# Patient Record
Sex: FEMALE | Race: WHITE | NOT HISPANIC OR LATINO | Employment: OTHER | URBAN - METROPOLITAN AREA
[De-identification: names, ages, dates, MRNs, and addresses within clinical notes are randomized per-mention and may not be internally consistent; named-entity substitution may affect disease eponyms.]

---

## 2017-01-02 ENCOUNTER — TELEPHONE (OUTPATIENT)
Dept: FAMILY MEDICINE | Facility: CLINIC | Age: 62
End: 2017-01-02

## 2017-01-02 PROBLEM — M85.80 OSTEOPENIA: Status: ACTIVE | Noted: 2017-01-02

## 2017-01-09 ENCOUNTER — TELEPHONE (OUTPATIENT)
Dept: FAMILY MEDICINE | Facility: CLINIC | Age: 62
End: 2017-01-09

## 2017-01-09 DIAGNOSIS — E78.5 HYPERLIPIDEMIA LDL GOAL <160: Primary | ICD-10-CM

## 2017-01-09 NOTE — LETTER
[facility]  [address] [facility] [id number]  [phone]        May 15, 2017    Lizy Clark  [address] [place] [id number]              Dear Lizy Clark    This is to remind you that your fasting lab work is due.    You may call our office at [phone] to schedule an appointment.    Please disregard this notice if you have already had your labs drawn or made an appointment.        Sincerely,        Lisbet Grant PA-C

## 2017-01-09 NOTE — TELEPHONE ENCOUNTER
Your bone scan shows osteopenia, the precursor to osteoporosis. As of now I recommend the following supplementation:  Calcium 600mg twice daily AND vitamin D 2,000 units once daily.  We'll repeat your bone scan in 3 years.    If she's getting 600mg of calcium twice daily through diet then that would do it - if she's falling short then I recommend supplementation also. I'm not concerned that this would increase cardiovascular disease.    Her triglycerides are normal - no concerns. Trigs are just another type of cholesterol in the blood stream. Her LDL (bad) cholesterol is high. I recommend starting medication to decrease cholesterol, decrease risks of plaque build up in the heart and decrease risk of heart attack. Is she agreeable?

## 2017-01-09 NOTE — TELEPHONE ENCOUNTER
Spoke with pt. She has a few questions she would like answered from DEXA scan and cholesterol labs. Please review below and advise. Raven Kaplan MA      1. She read some things online that had her worried. Her t-score from DEXA- is it normal or should she be worries?  2. She read taking high doses of calcium can lead to cardiovascular disease. She wanted to know if this was true?  3. Instead of taking supplements she get enough calcium through food and drink?  4. Care cholesterol and triglycerides linked? Does one score effect the other? She read you will see a change in triglycerides before you see a change in cholesterol numbers

## 2017-01-09 NOTE — TELEPHONE ENCOUNTER
Spoke with patient and informed her of results and further explanation per Lisbet Grant PA-C see below read word for word.   Patient verbalized understanding and stated she has only been on her new diet for one month and would like to give her diet at least another 6 months to see if she can get cholesterol numbers improved.  Will let provider know.

## 2017-06-26 ENCOUNTER — TELEPHONE (OUTPATIENT)
Dept: FAMILY MEDICINE | Facility: CLINIC | Age: 62
End: 2017-06-26

## 2017-06-26 NOTE — TELEPHONE ENCOUNTER
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 Please call to discuss. Thank you.

## 2017-10-11 ENCOUNTER — TELEPHONE (OUTPATIENT)
Dept: FAMILY MEDICINE | Facility: CLINIC | Age: 62
End: 2017-10-11

## 2017-10-11 NOTE — TELEPHONE ENCOUNTER
Patient states she would like to know what dosage of the calcium pills she is supposed to take.  Please call.    Thank you.

## 2017-10-11 NOTE — TELEPHONE ENCOUNTER
Attempted to call patient x 2.  Line busy.      Notes Recorded by Lisbet Grant PA-C on 1/2/2017 at 1:27 PM  Please send the following letter to the patient:    Lizy,    Your bone scan shows osteopenia, the precursor to osteoporosis. As of now I recommend the following supplementation:  Calcium 600mg twice daily AND vitamin D 2,000 units once daily.  We'll repeat your bone scan in 3 years.    Please call me with any questions or concerns.

## 2017-10-13 NOTE — TELEPHONE ENCOUNTER
Spoke with patient. Went over supplement instructions per Lisbet Grant PA-C see below.  Patient verbalized understanding and had no further concerns.

## 2022-04-04 ENCOUNTER — OFFICE VISIT (OUTPATIENT)
Dept: FAMILY MEDICINE | Facility: CLINIC | Age: 67
End: 2022-04-04
Payer: COMMERCIAL

## 2022-04-04 VITALS
RESPIRATION RATE: 20 BRPM | HEIGHT: 63 IN | OXYGEN SATURATION: 95 % | TEMPERATURE: 98.5 F | DIASTOLIC BLOOD PRESSURE: 108 MMHG | WEIGHT: 148.6 LBS | BODY MASS INDEX: 26.33 KG/M2 | HEART RATE: 89 BPM | SYSTOLIC BLOOD PRESSURE: 161 MMHG

## 2022-04-04 DIAGNOSIS — E78.5 HYPERLIPIDEMIA LDL GOAL <70: Primary | ICD-10-CM

## 2022-04-04 DIAGNOSIS — Z86.73 HISTORY OF CEREBROVASCULAR ACCIDENT: ICD-10-CM

## 2022-04-04 PROCEDURE — 99203 OFFICE O/P NEW LOW 30 MIN: CPT | Performed by: PHYSICIAN ASSISTANT

## 2022-04-04 RX ORDER — ATORVASTATIN CALCIUM 40 MG/1
40 TABLET, FILM COATED ORAL DAILY
Qty: 60 TABLET | Refills: 0 | Status: SHIPPED | OUTPATIENT
Start: 2022-04-04 | End: 2022-04-08

## 2022-04-04 RX ORDER — ATORVASTATIN CALCIUM 40 MG/1
1 TABLET, FILM COATED ORAL DAILY
COMMUNITY
Start: 2022-02-23 | End: 2022-04-04

## 2022-04-04 RX ORDER — FOLIC ACID/MULTIVIT,IRON,MINER 0.4MG-18MG
1 TABLET ORAL DAILY
COMMUNITY

## 2022-04-04 ASSESSMENT — PAIN SCALES - GENERAL: PAINLEVEL: NO PAIN (0)

## 2022-04-04 NOTE — PROGRESS NOTES
"  Assessment & Plan     1. Hyperlipidemia LDL goal <70    2. History of cerebrovascular accident        1,2) Lipitor renewed, no changes. She will follow up for a physical in ~1 month and we'll do fasting labs that day.      Prescription drug management         BMI:   Estimated body mass index is 26.32 kg/m  as calculated from the following:    Height as of this encounter: 1.6 m (5' 3\").    Weight as of this encounter: 67.4 kg (148 lb 9.6 oz).       Return in about 1 month (around 5/4/2022) for your annual physical, fasting labs.    Lisbet Grant PA-C  [facility] JAY Medel is a 66 year old who presents for the following health issues     HPI     Establish Care  Last OV with Lisbet Grant PA-C was 12/21/2016    Hyperlipidemia Follow-Up      Are you regularly taking any medication or supplement to lower your cholesterol?   Yes- atorvastatin (LIPITOR) 40 MG tablet    Are you having muscle aches or other side effects that you think could be caused by your cholesterol lowering medication?  No      Review of Systems   Constitutional, cardiovascular systems are negative, except as otherwise noted.      Objective    BP (!) 161/108   Pulse 89   Temp 98.5  F (36.9  C) (Tympanic)   Resp 20   Ht 1.6 m (5' 3\")   Wt 67.4 kg (148 lb 9.6 oz)   LMP  (LMP Unknown)   SpO2 95%   Breastfeeding No   BMI 26.32 kg/m    Body mass index is 26.32 kg/m .  Physical Exam   GENERAL: healthy, alert and no distress  MS: no gross musculoskeletal defects noted, no edema  SKIN: no suspicious lesions or rashes  NEURO: Normal strength and tone, mentation intact and speech normal  PSYCH: mentation appears normal, affect normal/bright    ----- Services Performed by a MEDICAL STUDENT in Presence of ATTENDING Physician-------        "

## 2022-04-04 NOTE — PATIENT INSTRUCTIONS
Ask your insurance if they'll cover a 24-hour blood pressure at home monitor for elevated blood pressure readings (without hypertension).    Ask your insurance about the Shingles vaccine:  1) Is it covered?  2) Where should I get it?

## 2022-04-04 NOTE — NURSING NOTE
Advance Care Planning    Health care directive given to patient today. Patient will bring a copy to next appointment.    Idalia Simmons CMA on 4/4/2022 at 5:00 PM

## 2022-08-24 ENCOUNTER — OFFICE VISIT (OUTPATIENT)
Dept: FAMILY MEDICINE | Facility: CLINIC | Age: 67
End: 2022-08-24
Payer: COMMERCIAL

## 2022-08-24 ENCOUNTER — MYC MEDICAL ADVICE (OUTPATIENT)
Dept: FAMILY MEDICINE | Facility: CLINIC | Age: 67
End: 2022-08-24

## 2022-08-24 VITALS
TEMPERATURE: 97.6 F | HEIGHT: 63 IN | BODY MASS INDEX: 25.73 KG/M2 | SYSTOLIC BLOOD PRESSURE: 142 MMHG | WEIGHT: 145.2 LBS | OXYGEN SATURATION: 97 % | HEART RATE: 82 BPM | RESPIRATION RATE: 16 BRPM | DIASTOLIC BLOOD PRESSURE: 78 MMHG

## 2022-08-24 DIAGNOSIS — E78.5 HYPERLIPIDEMIA LDL GOAL <160: Primary | ICD-10-CM

## 2022-08-24 DIAGNOSIS — E78.5 HYPERLIPIDEMIA LDL GOAL <70: ICD-10-CM

## 2022-08-24 DIAGNOSIS — R03.0 ELEVATED BLOOD PRESSURE READING WITHOUT DIAGNOSIS OF HYPERTENSION: ICD-10-CM

## 2022-08-24 DIAGNOSIS — Z12.31 VISIT FOR SCREENING MAMMOGRAM: ICD-10-CM

## 2022-08-24 DIAGNOSIS — Z78.0 POSTMENOPAUSAL STATUS: ICD-10-CM

## 2022-08-24 DIAGNOSIS — Z01.01 FAILED VISION SCREEN: ICD-10-CM

## 2022-08-24 DIAGNOSIS — Z13.1 DIABETES MELLITUS SCREENING: ICD-10-CM

## 2022-08-24 PROBLEM — R73.01 ELEVATED FASTING GLUCOSE: Status: ACTIVE | Noted: 2022-08-24

## 2022-08-24 LAB
ALBUMIN SERPL-MCNC: 3.9 G/DL (ref 3.4–5)
ALP SERPL-CCNC: 78 U/L (ref 40–150)
ALT SERPL W P-5'-P-CCNC: 43 U/L (ref 0–50)
AST SERPL W P-5'-P-CCNC: 24 U/L (ref 0–45)
BILIRUB DIRECT SERPL-MCNC: 0.2 MG/DL (ref 0–0.2)
BILIRUB SERPL-MCNC: 1.1 MG/DL (ref 0.2–1.3)
CHOLEST SERPL-MCNC: 178 MG/DL
FASTING STATUS PATIENT QL REPORTED: YES
FASTING STATUS PATIENT QL REPORTED: YES
GLUCOSE BLD-MCNC: 106 MG/DL (ref 70–99)
HDLC SERPL-MCNC: 81 MG/DL
LDLC SERPL CALC-MCNC: 86 MG/DL
NONHDLC SERPL-MCNC: 97 MG/DL
PROT SERPL-MCNC: 7.2 G/DL (ref 6.8–8.8)
TRIGL SERPL-MCNC: 54 MG/DL

## 2022-08-24 PROCEDURE — 36415 COLL VENOUS BLD VENIPUNCTURE: CPT | Performed by: PHYSICIAN ASSISTANT

## 2022-08-24 PROCEDURE — 99213 OFFICE O/P EST LOW 20 MIN: CPT | Mod: 25 | Performed by: PHYSICIAN ASSISTANT

## 2022-08-24 PROCEDURE — 90715 TDAP VACCINE 7 YRS/> IM: CPT | Performed by: PHYSICIAN ASSISTANT

## 2022-08-24 PROCEDURE — 90471 IMMUNIZATION ADMIN: CPT | Performed by: PHYSICIAN ASSISTANT

## 2022-08-24 PROCEDURE — 80076 HEPATIC FUNCTION PANEL: CPT | Performed by: PHYSICIAN ASSISTANT

## 2022-08-24 PROCEDURE — 82947 ASSAY GLUCOSE BLOOD QUANT: CPT | Performed by: PHYSICIAN ASSISTANT

## 2022-08-24 PROCEDURE — 80061 LIPID PANEL: CPT | Performed by: PHYSICIAN ASSISTANT

## 2022-08-24 PROCEDURE — G0402 INITIAL PREVENTIVE EXAM: HCPCS | Performed by: PHYSICIAN ASSISTANT

## 2022-08-24 RX ORDER — ATORVASTATIN CALCIUM 40 MG/1
40 TABLET, FILM COATED ORAL DAILY
Qty: 90 TABLET | Refills: 3 | Status: SHIPPED | OUTPATIENT
Start: 2022-08-24 | End: 2023-10-16

## 2022-08-24 ASSESSMENT — ENCOUNTER SYMPTOMS
DYSURIA: 0
WEAKNESS: 0
FEVER: 0
CONSTIPATION: 0
HEMATURIA: 0
NAUSEA: 0
HEADACHES: 0
ARTHRALGIAS: 0
ABDOMINAL PAIN: 0
SORE THROAT: 0
DIZZINESS: 0
COUGH: 0
DIARRHEA: 0
NERVOUS/ANXIOUS: 0
HEARTBURN: 0
CHILLS: 0
JOINT SWELLING: 0
PALPITATIONS: 0
SHORTNESS OF BREATH: 0
BREAST MASS: 0
MYALGIAS: 0
HEMATOCHEZIA: 0
FREQUENCY: 0
PARESTHESIAS: 0

## 2022-08-24 ASSESSMENT — PAIN SCALES - GENERAL: PAINLEVEL: NO PAIN (0)

## 2022-08-24 ASSESSMENT — ACTIVITIES OF DAILY LIVING (ADL): CURRENT_FUNCTION: NO ASSISTANCE NEEDED

## 2022-08-24 NOTE — PROGRESS NOTES
"SUBJECTIVE:   Lizy Clark is a 67 year old female who presents for Preventive Visit.      Patient has been advised of split billing requirements and indicates understanding: Yes  Are you in the first 12 months of your Medicare coverage?  Yes,  Visual Acuity:  Right Eye: 20/50   Left Eye: 20/80  Both Eyes: 20/50    Healthy Habits:     In general, how would you rate your overall health?  Excellent    Frequency of exercise:  4-5 days/week    Duration of exercise:  45-60 minutes    Do you usually eat at least 4 servings of fruit and vegetables a day, include whole grains    & fiber and avoid regularly eating high fat or \"junk\" foods?  Yes    Taking medications regularly:  Yes    Medication side effects:  None    Ability to successfully perform activities of daily living:  No assistance needed    Home Safety:  No safety concerns identified    Hearing Impairment:  No hearing concerns    In the past 6 months, have you been bothered by leaking of urine?  No    In general, how would you rate your overall mental or emotional health?  Excellent      PHQ-2 Total Score: 0    Additional concerns today:  No    Do you feel safe in your environment? Yes    Have you ever done Advance Care Planning? (For example, a Health Directive, POLST, or a discussion with a medical provider or your loved ones about your wishes): No, advance care planning information given to patient to review.  Advanced care planning was discussed at today's visit.       Fall risk  Fallen 2 or more times in the past year?: No  Any fall with injury in the past year?: No    Cognitive Screening   1) Repeat 3 items (Leader, Season, Table)    2) Clock draw: NORMAL  3) 3 item recall: Recalls 3 objects  Results: 3 items recalled: COGNITIVE IMPAIRMENT LESS LIKELY    Mini-CogTM Copyright YOSHI Tee. Licensed by the author for use in [facility]; reprinted with permission (mary [email or web address]). All rights reserved.      Reviewed and updated as needed this visit by " clinical staff   Tobacco  Allergies  Meds   Med Hx  Surg Hx  Fam Hx  Soc Hx          Reviewed and updated as needed this visit by Provider                   Social History     Tobacco Use     Smoking status: Never Smoker     Smokeless tobacco: Never Used   Substance Use Topics     Alcohol use: No     Alcohol/week: 0.0 standard drinks         Alcohol Use 8/24/2022   Prescreen: >3 drinks/day or >7 drinks/week? No   Prescreen: >3 drinks/day or >7 drinks/week? -       PROBLEMS TO ADD ON...  -------------------------------------  Discuss vitamins she is taking     Skin check - mole on right cheek    Needing refills and/or labs for:  Hyperlipidemia  Following a low fat diet? Yes  Medication side effects? No    Additional medications:  None      Current providers sharing in care for this patient include:   Patient Care Team:  Lisbet Grant PA-C as PCP - General (Physician Assistant)  Lisbet Grant PA-C as Physician Assistant (Physician Assistant)  Lisbet Grant PA-C as Assigned PCP    The following health maintenance items are reviewed in Epic and correct as of today:  Health Maintenance Due   Topic Date Due     ZOSTER IMMUNIZATION (1 of 2) Never done     LIPID  12/21/2017     MAMMO SCREENING  12/21/2018     Pneumococcal Vaccine: 65+ Years (1 - PCV) Never done     COVID-19 Vaccine (3 - Booster for Moderna series) 11/15/2021     DEXA  12/30/2021     Lab work is in process  Labs reviewed in EPIC    FHS-7:   Breast CA Risk Assessment (FHS-7) 8/24/2022   Did any of your first-degree relatives have breast or ovarian cancer? Yes   Did any of your relatives have bilateral breast cancer? No       Mammogram Screening: Recommended mammography every 1-2 years with patient discussion and risk factor consideration  Pertinent mammograms are reviewed under the imaging tab.    Review of Systems   Constitutional: Negative for chills and fever.   HENT: Negative for congestion, ear pain, hearing loss and sore  "throat.    Eyes: Negative for visual disturbance.   Respiratory: Negative for cough and shortness of breath.    Cardiovascular: Negative for chest pain, palpitations and peripheral edema.   Gastrointestinal: Negative for abdominal pain, constipation, diarrhea, heartburn, hematochezia and nausea.   Breasts:  Negative for tenderness, breast mass and discharge.   Genitourinary: Negative for dysuria, frequency, genital sores, hematuria, pelvic pain, urgency, vaginal bleeding and vaginal discharge.   Musculoskeletal: Negative for arthralgias, joint swelling and myalgias.   Skin: Negative for rash.   Neurological: Negative for dizziness, weakness, headaches and paresthesias.   Psychiatric/Behavioral: Negative for mood changes. The patient is not nervous/anxious.        OBJECTIVE:   BP (!) 142/78 (BP Location: Left arm, Patient Position: Sitting, Cuff Size: Adult Regular)   Pulse 82   Temp 97.6  F (36.4  C) (Tympanic)   Resp 16   Ht 1.6 m (5' 3\")   Wt 65.9 kg (145 lb 3.2 oz)   LMP  (LMP Unknown)   SpO2 97%   Breastfeeding No   BMI 25.72 kg/m   Estimated body mass index is 25.72 kg/m  as calculated from the following:    Height as of this encounter: 1.6 m (5' 3\").    Weight as of this encounter: 65.9 kg (145 lb 3.2 oz).  Physical Exam  GENERAL: healthy, alert and no distress  EYES: Eyes grossly normal to inspection, PERRL and conjunctivae and sclerae normal  HENT: ear canals and TM's normal, nose and mouth without ulcers or lesions  NECK: no adenopathy, no asymmetry, masses, or scars and thyroid normal to palpation  RESP: lungs clear to auscultation - no rales, rhonchi or wheezes  BREAST: normal without masses, tenderness or nipple discharge and no palpable axillary masses or adenopathy  CV: regular rates and rhythm, normal S1 S2, no S3 or S4 and no murmur, click or rub  ABDOMEN: soft, nontender, no hepatosplenomegaly, no masses and bowel sounds normal  MS: no gross musculoskeletal defects noted, no edema  SKIN: no " "suspicious lesions or rashes  NEURO: Normal strength and tone, mentation intact and speech normal  PSYCH: mentation appears normal, affect normal/bright      ASSESSMENT / PLAN:       ICD-10-CM    1. Hyperlipidemia LDL goal <160  E78.5 Lipid panel reflex to direct LDL Fasting     Hepatic function panel     Lipid panel reflex to direct LDL Fasting     Hepatic function panel   2. Diabetes mellitus screening  Z13.1 Glucose     Glucose   3. Visit for screening mammogram  Z12.31 MA SCREENING DIGITAL BILAT - Future  (s+30)   4. Postmenopausal status  Z78.0 DX Hip/Pelvis/Spine   5. Elevated blood pressure reading without diagnosis of hypertension  R03.0 24 Hour Blood Pressure Monitor - Adult   6. Hyperlipidemia LDL goal <70  E78.5 atorvastatin (LIPITOR) 40 MG tablet   7. Failed vision screen  Z01.01 Adult Eye  Referral       1-4) Screenings discussed    5) Blood pressure elevated. We discussed doing a 24-hour home blood pressure monitor for further assessment. She'll call her insurance regarding coverage.     6) Due for repeat lipid panel and LFTs. Med renewed, no change    7) Referral to eye      COUNSELING:  Reviewed preventive health counseling, as reflected in patient instructions    Estimated body mass index is 25.72 kg/m  as calculated from the following:    Height as of this encounter: 1.6 m (5' 3\").    Weight as of this encounter: 65.9 kg (145 lb 3.2 oz).    She reports that she has never smoked. She has never used smokeless tobacco.      Appropriate preventive services were discussed with this patient, including applicable screening as appropriate for cardiovascular disease, diabetes, osteopenia/osteoporosis, and glaucoma.  As appropriate for age/gender, discussed screening for colorectal cancer, prostate cancer, breast cancer, and cervical cancer. Checklist reviewing preventive services available has been given to the patient.    Reviewed patients plan of care and provided an AVS. The Basic Care Plan " (routine screening as documented in Health Maintenance) for Lizy meets the Care Plan requirement. This Care Plan has been established and reviewed with the Patient.    Counseling Resources:  ATP IV Guidelines  Pooled Cohorts Equation Calculator  Breast Cancer Risk Calculator  Breast Cancer: Medication to Reduce Risk  FRAX Risk Assessment  ICSI Preventive Guidelines  Dietary Guidelines for Americans, 2010  USDA's MyPlate  ASA Prophylaxis  Lung CA Screening    SO[facility] JAY    Identified Health Risks:

## 2022-08-24 NOTE — PATIENT INSTRUCTIONS
Call your insurance and ask if a 24-hour home blood pressure monitor is covered for elevated blood pressure readings without a diagnosis of hypertension.     Start a baby (81mg) aspirin once daily with a meal.    Ask your insurance about the Shingles vaccine:  1) Is it covered?  2) Where should I get it?

## 2022-08-24 NOTE — PROGRESS NOTES
Prior to immunization administration, verified patients identity using patient s name and date of birth. Please see Immunization Activity for additional information.     Screening Questionnaire for Adult Immunization    Are you sick today?   No   Do you have allergies to medications, food, a vaccine component or latex?   No   Have you ever had a serious reaction after receiving a vaccination?   No   Do you have a long-term health problem with heart, lung, kidney, or metabolic disease (e.g., diabetes), asthma, a blood disorder, no spleen, complement component deficiency, a cochlear implant, or a spinal fluid leak?  Are you on long-term aspirin therapy?   No   Do you have cancer, leukemia, HIV/AIDS, or any other immune system problem?   No   Do you have a parent, brother, or sister with an immune system problem?   No   In the past 3 months, have you taken medications that affect  your immune system, such as prednisone, other steroids, or anticancer drugs; drugs for the treatment of rheumatoid arthritis, Crohn s disease, or psoriasis; or have you had radiation treatments?   No   Have you had a seizure, or a brain or other nervous system problem?   No   During the past year, have you received a transfusion of blood or blood    products, or been given immune (gamma) globulin or antiviral drug?   No   For women: Are you pregnant or is there a chance you could become       pregnant during the next month?   No   Have you received any vaccinations in the past 4 weeks?   No     Immunization questionnaire answers were all negative.        Per orders of Lisbet Grant PA-C, injection of Tdap given by Idalia Antoine CMA. Patient instructed to remain in clinic for 15 minutes afterwards, and to report any adverse reaction to me immediately.       Screening performed by Idalia Antoine CMA on 8/24/2022 at 7:53 AM.

## 2022-08-26 ENCOUNTER — NURSE TRIAGE (OUTPATIENT)
Dept: NURSING | Facility: CLINIC | Age: 67
End: 2022-08-26

## 2022-08-26 NOTE — TELEPHONE ENCOUNTER
Patient calling requesting information on COVID 19 exposure.    Patient denies known exposure or current symptoms.    Stating she was asking on behalf of 2 year old child who had exposure only and attends her .    Reviewed COVID 19 exposure guidelines.    Reviewed with caller further information available on CDC web site.     Advised option to have mother of patient call back to complete triage.    Thea Galeas RN  [facility]          Reason for Disposition    [1] CLOSE CONTACT COVID-19 EXPOSURE within last 14 days AND [2] NO symptoms    Additional Information    Negative: COVID-19 lab test positive    Negative: [1] Lives with someone known to have influenza (flu test positive) AND [2] flu-like symptoms (e.g., cough, runny nose, sore throat, SOB; with or without fever)    Negative: [1] Symptoms of COVID-19 (e.g., cough, fever, SOB, or others) AND [2] doctor (or NP/PA) diagnosed COVID-19 based on symptoms    Negative: [1] Symptoms of COVID-19 (e.g., cough, fever, SOB, or others) AND [2] lives in an area with community spread    Negative: [1] Symptoms of COVID-19 (e.g., cough, fever, SOB, or others) AND [2] within 14 days of EXPOSURE (close contact) with diagnosed or suspected COVID-19 patient    Negative: [1] Symptoms of COVID-19 (e.g., cough, fever, SOB, or others) AND [2] within 14 days of travel from high-risk area for COVID-19 community spread (identified by CDC)    Negative: [1] Difficulty breathing (shortness of breath) occurs AND [2] onset > 14 days after COVID-19 EXPOSURE (Close Contact)    Negative: [1] Dry cough occurs AND [2] onset > 14 days after COVID-19 EXPOSURE    Negative: [1] Wet cough (i.e., white-yellow, yellow, green, or lalo colored sputum) AND [2] onset > 14 days after COVID-19 EXPOSURE    Negative: [1] Common cold symptoms AND [2] onset > 14 days after COVID-19 EXPOSURE    Negative: COVID-19 vaccine reaction suspected (e.g., fever, headache, muscle aches) occurring  during days 1 to 3 after getting vaccine    Negative: COVID-19 vaccine, questions about    Negative: [1] CLOSE CONTACT COVID-19 EXPOSURE within last 14 days AND [2] needs COVID-19 lab test to return to work AND [3] NO symptoms    Negative: [1] CLOSE CONTACT COVID-19 EXPOSURE within last 14 days AND [2] exposed person is a  (e.g., police or paramedic) AND [3] NO symptoms    Negative: [1] CLOSE CONTACT COVID-19 EXPOSURE within last 14 days AND [2] exposed person is a healthcare worker who was NOT using all recommended personal protective equipment (e.g., a respirator-N95 mask, eye protection, gloves, and gown) AND [3] NO symptoms    Negative: [1] Living or working in a correctional facility, long-term care facility, or shelter (i.e., congregate setting; densely populated) AND [2] where an outbreak has occurred AND [3] NO symptoms    Negative: [1] CLOSE CONTACT COVID-19 EXPOSURE within last 14 days AND [2] weak immune system (e.g., HIV positive, cancer chemo, splenectomy, organ transplant, chronic steroids) AND [3] NO symptoms    Protocols used: CORONAVIRUS (COVID-19) EXPOSURE-A-AH

## 2022-08-28 DIAGNOSIS — E78.5 HYPERLIPIDEMIA LDL GOAL <70: ICD-10-CM

## 2022-08-28 RX ORDER — ATORVASTATIN CALCIUM 40 MG/1
TABLET, FILM COATED ORAL
Qty: 90 TABLET | Refills: 3 | Status: CANCELLED | OUTPATIENT
Start: 2022-08-28

## 2022-08-29 RX ORDER — ATORVASTATIN CALCIUM 40 MG/1
TABLET, FILM COATED ORAL
Qty: 90 TABLET | Refills: 3 | OUTPATIENT
Start: 2022-08-29

## 2022-08-29 NOTE — TELEPHONE ENCOUNTER
Duplicate for Atorvastatin (Simvastatin) year supply was sent on 8/24/22.   Verónica Mcdaniel, RN, BSN

## 2022-10-22 ENCOUNTER — HEALTH MAINTENANCE LETTER (OUTPATIENT)
Age: 67
End: 2022-10-22

## 2022-11-09 ENCOUNTER — OFFICE VISIT (OUTPATIENT)
Dept: FAMILY MEDICINE | Facility: CLINIC | Age: 67
End: 2022-11-09
Payer: COMMERCIAL

## 2022-11-09 VITALS
RESPIRATION RATE: 16 BRPM | HEART RATE: 94 BPM | DIASTOLIC BLOOD PRESSURE: 82 MMHG | SYSTOLIC BLOOD PRESSURE: 142 MMHG | TEMPERATURE: 98.1 F | BODY MASS INDEX: 25.83 KG/M2 | OXYGEN SATURATION: 98 % | WEIGHT: 145.8 LBS

## 2022-11-09 DIAGNOSIS — B07.0 PLANTAR WART: Primary | ICD-10-CM

## 2022-11-09 DIAGNOSIS — R03.0 ELEVATED BLOOD PRESSURE READING WITHOUT DIAGNOSIS OF HYPERTENSION: ICD-10-CM

## 2022-11-09 PROCEDURE — 99213 OFFICE O/P EST LOW 20 MIN: CPT | Mod: 25 | Performed by: PHYSICIAN ASSISTANT

## 2022-11-09 PROCEDURE — 17110 DESTRUCTION B9 LES UP TO 14: CPT | Performed by: PHYSICIAN ASSISTANT

## 2022-11-09 ASSESSMENT — PAIN SCALES - GENERAL: PAINLEVEL: NO PAIN (0)

## 2022-11-09 NOTE — PATIENT INSTRUCTIONS
Marah Medel,    Thank you for allowing [facility] to manage your care.    Your blood pressure was high today. Get a blood pressure cuff for use at home. Take and record your blood pressure daily for 2 weeks. If your blood pressure is greater than or equal to 140/90mm Hg more than half of the time, please schedule an appointment with us for a recheck.    Once your wart has healed, please begin using Compound W product (or generic equivalent) as directed. If in one to two months the wart is still visible or you have symptoms, please see us again for another liquid nitrogen treatment.    If you have any questions or concerns, please feel free to call us at [phone]    Sincerely,    Reg Reagan PA-C    Did you know?      You can schedule a video visit for follow-up appointments as well as future appointments for certain conditions.  Please see the below link.     [email or web address]    If you have not already done so,  I encourage you to sign up for [email or web address] ([email or web address]).  This will allow you to review your results, securely communicate with a provider, and schedule virtual visits as well.

## 2022-11-09 NOTE — PROGRESS NOTES
"  Assessment & Plan   Problem List Items Addressed This Visit    None  Visit Diagnoses     Plantar wart    -  Primary    Relevant Orders    DESTRUCT BENIGN LESION, UP TO 14 (Completed)    Elevated blood pressure reading without diagnosis of hypertension             Wart treated as below with good tolerance and no complications.  I have no concerns for concurrent infectious processes such as cellulitis, abscess or necrotizing fasciitis.  She will use over-the-counter remedies after the procedure site has healed and follow-up with us in the next 1-2 months as needed. Tetanus UTD.    Complete history and physical exam as below. AF with normal VS except for elevated bp, which they will monitor at home and contact us if >140/90mmHg greater than 50% of the time.    DDx and Dx discussed with and explained to the pt to their satisfaction.  All questions were answered at this time. Pt expressed understanding of and agreement with this dx, tx, and plan. No further workup warranted and standard medication warnings given. I have given the patient a list of pertinent indications for re-evaluation. Will go to the Emergency Department if symptoms worsen or new concerning symptoms arise. Patient left in no apparent distress.     BMI:   Estimated body mass index is 25.83 kg/m  as calculated from the following:    Height as of 8/24/22: 1.6 m (5' 3\").    Weight as of this encounter: 66.1 kg (145 lb 12.8 oz).     See Patient Instructions    Return in about 1 month (around 12/9/2022) for a recheck of your symptoms if not improving.    GENTRY Ceron [facility] JAY Medel is a 67 year old presenting for the following health issues:  Wart      HPI     Warts  Onset/Duration: 1 year  Description (location/number): bottom of right foot, 1  Accompanying signs and symptoms (pain, redness): painful  History: prior warts:  no   Therapies tried and outcome: wart band aids    Pain is mild and non-radiating. " Fairly constant. No drainage.    Review of Systems   Constitutional, HEENT, cardiovascular, pulmonary, gi and gu systems are negative, except as otherwise noted.      Objective    BP (!) 142/82   Pulse 94   Temp 98.1  F (36.7  C) (Tympanic)   Resp 16   Wt 66.1 kg (145 lb 12.8 oz)   LMP  (LMP Unknown)   SpO2 98%   BMI 25.83 kg/m    Body mass index is 25.83 kg/m .  Physical Exam  Vitals and nursing note reviewed.   Constitutional:       General: She is not in acute distress.     Appearance: Normal appearance. She is not diaphoretic.   HENT:      Head: Normocephalic and atraumatic.      Nose: Nose normal.   Eyes:      Conjunctiva/sclera: Conjunctivae normal.   Pulmonary:      Effort: Pulmonary effort is normal. No respiratory distress.   Skin:     General: Skin is dry.      Coloration: Skin is not jaundiced or pale.      Comments: RLE: 3mm area of firmness with central core consistent with a plantar wart along the plantar surface overlying the 2nd MTP. Distal CMS intact. Remainder of limb non-tender. No other overlying signs of trauma or infection.   Neurological:      General: No focal deficit present.      Mental Status: She is alert. Mental status is at baseline.   Psychiatric:         Mood and Affect: Mood normal.         Behavior: Behavior normal.     Procedure: Wart cryotherapy    Verbal consent was obtained from parent/patient after a discussion of the risks and benefits. Area was prepped with alcohol and/or betadine. Liquid nitrogen was applied using a spray gun with good tolerance to the lesion after paring down with a #15 blade. No blood loss. 3 rounds of freeze/thaw. Pt tolerated the procedure well.  There were no complications.

## 2022-11-21 ENCOUNTER — NURSE TRIAGE (OUTPATIENT)
Dept: FAMILY MEDICINE | Facility: CLINIC | Age: 67
End: 2022-11-21

## 2022-11-21 NOTE — TELEPHONE ENCOUNTER
"    Reason for Disposition    [1] MILD dizziness (e.g., walking normally) AND [2] has NOT been evaluated by physician for this  (Exception: dizziness caused by heat exposure, sudden standing, or poor fluid intake)    Additional Information    Negative: SEVERE difficulty breathing (e.g., struggling for each breath, speaks in single words)    Negative: [1] Difficulty breathing or swallowing AND [2] started suddenly after medicine, an allergic food or bee sting    Negative: Shock suspected (e.g., cold/pale/clammy skin, too weak to stand, low BP, rapid pulse)    Negative: Difficult to awaken or acting confused (e.g., disoriented, slurred speech)    Negative: [1] Weakness (i.e., paralysis, loss of muscle strength) of the face, arm or leg on one side of the body AND [2] sudden onset AND [3] present now    Negative: [1] Numbness (i.e., loss of sensation) of the face, arm or leg on one side of the body AND [2] sudden onset AND [3] present now    Negative: [1] Loss of speech or garbled speech AND [2] sudden onset AND [3] present now    Negative: Overdose (accidental or intentional) of medications    Negative: [1] Fainted > 15 minutes ago AND [2] still feels too weak or dizzy to stand    Negative: Heart beating < 50 beats per minute OR > 140 beats per minute    Negative: Sounds like a life-threatening emergency to the triager    Negative: Chest pain    Negative: Rectal bleeding, bloody stool, or tarry-black stool    Negative: [1] Vomiting AND [2] contains red blood or black (\"coffee ground\") material    Negative: Vomiting is main symptom    Negative: Diarrhea is main symptom    Negative: Headache is main symptom    Negative: Patient states that they are having an anxiety or panic attack    Negative: Dizziness from low blood sugar (i.e., < 60 mg/dl or 3.5 mmol/l)    Negative: Dizziness is described as a spinning sensation (i.e., vertigo)    Negative: Heat exhaustion suspected (i.e., dehydration from heat exposure)    Negative: " "Difficulty breathing    Negative: SEVERE dizziness (e.g., unable to stand, requires support to walk, feels like passing out now)    Negative: Extra heart beats OR irregular heart beating (i.e., \"palpitations\")    Negative: [1] Drinking very little AND [2] dehydration suspected (e.g., no urine > 12 hours, very dry mouth, very lightheaded)    Negative: [1] Weakness (i.e., paralysis, loss of muscle strength) of the face, arm / hand, or leg / foot on one side of the body AND [2] sudden onset AND [3] brief (now gone)    Negative: [1] Numbness (i.e., loss of sensation) of the face, arm / hand, or leg / foot on one side of the body AND [2] sudden onset AND [3] brief (now gone)    Negative: [1] Loss of speech or garbled speech AND [2] sudden onset AND [3] brief (now gone)    Negative: Loss of vision or double vision (Exception: similar to previous migraines)    Negative: Patient sounds very sick or weak to the triager    Negative: [1] Dizziness caused by heat exposure, sudden standing, or poor fluid intake AND [2] no improvement after 2 hours of rest and fluids    Negative: [1] Fever > 103 F (39.4 C) AND [2] not able to get the fever down using Fever Care Advice    Negative: [1] Fever > 101 F (38.3 C) AND [2] age > 60 years    Negative: [1] Fever > 100.0 F (37.8 C) AND [2] bedridden (e.g., nursing home patient, CVA, chronic illness, recovering from surgery)    Negative: [1] Fever > 100.0 F (37.8 C) AND [2] diabetes mellitus or weak immune system (e.g., HIV positive, cancer chemo, splenectomy, organ transplant, chronic steroids)    Negative: [1] MODERATE dizziness (e.g., interferes with normal activities) AND [2] has NOT been evaluated by physician for this  (Exception: dizziness caused by heat exposure, sudden standing, or poor fluid intake)    Negative: Fever present > 3 days (72 hours)    Negative: Taking a medicine that could cause dizziness (e.g., blood pressure medications, diuretics)    Answer Assessment - Initial " "Assessment Questions  1. DESCRIPTION: \"Describe your dizziness.\"      Dizzy and nauseated for 2 weeks  2. LIGHTHEADED: \"Do you feel lightheaded?\" (e.g., somewhat faint, woozy, weak upon standing)     Sometimes feels room spinning and then she gets her bearings and fine to walk   3. VERTIGO: \"Do you feel like either you or the room is spinning or tilting?\" (i.e. vertigo)      As above  4. SEVERITY: \"How bad is it?\"  \"Do you feel like you are going to faint?\" \"Can you stand and walk?\"    Had to call in sick one day as \"I was feeling sick     5. ONSET:  \"When did the dizziness begin?\"      As above  6. AGGRAVATING FACTORS: \"Does anything make it worse?\" (e.g., standing, change in head position)    Worse in morning  7. HEART RATE: \"Can you tell me your heart rate?\" \"How many beats in 15 seconds?\"  (Note: not all patients can do this)       Feels heart rates is normal  8. CAUSE: \"What do you think is causing the dizziness?\"     Avorastatin, but she has been on this for 1 1/2 years  9. RECURRENT SYMPTOM: \"Have you had dizziness before?\" If Yes, ask: \"When was the last time?\" \"What happened that time?\"  not  10. OTHER SYMPTOMS: \"Do you have any other symptoms?\" (e.g., fever, chest pain, vomiting, diarrhea, bleeding)       No fever, no vomiting, normal bowels but has had diarrhea in the last couple days, loose stools twice a day    Protocols used: DIZZINESS - [id number]      "

## 2022-11-21 NOTE — TELEPHONE ENCOUNTER
Pt offered appt on 11/23 but she could not due to her work schedule so she choose to wait until 11/25 appt which was made for her with her PCP    She was also advised to take a covid test as this started with some cold sx     Sharla Petty, RN, BSN  [facility]            p

## 2022-12-19 ENCOUNTER — ANCILLARY PROCEDURE (OUTPATIENT)
Dept: MAMMOGRAPHY | Facility: CLINIC | Age: 67
End: 2022-12-19
Attending: PHYSICIAN ASSISTANT
Payer: COMMERCIAL

## 2022-12-19 DIAGNOSIS — Z12.31 VISIT FOR SCREENING MAMMOGRAM: ICD-10-CM

## 2022-12-19 PROCEDURE — 77067 SCR MAMMO BI INCL CAD: CPT | Performed by: RADIOLOGY

## 2022-12-19 PROCEDURE — 77063 BREAST TOMOSYNTHESIS BI: CPT | Performed by: RADIOLOGY

## 2022-12-22 ENCOUNTER — TELEPHONE (OUTPATIENT)
Dept: FAMILY MEDICINE | Facility: CLINIC | Age: 67
End: 2022-12-22

## 2022-12-22 NOTE — TELEPHONE ENCOUNTER
Patient calling for Mammogram results (done on 12/19/2022); informed patient results looked normal but provider has not commented at this time.      Will route to provider.    Patient disconnected unexpectedly; Called patient, no answer, left message on voicemail to call [facility] at [phone].    Trudy Adamson RN

## 2022-12-22 NOTE — TELEPHONE ENCOUNTER
Spoke with patient, relayed Lisbet Grant's message and patient verbalized understanding.    Trudy Adamson RN

## 2022-12-22 NOTE — TELEPHONE ENCOUNTER
IMPRESSION: ACR BI-RADS Category 1: Negative     RECOMMENDED FOLLOW-UP: Annual routine screening mammogram

## 2023-01-17 ENCOUNTER — TELEPHONE (OUTPATIENT)
Dept: FAMILY MEDICINE | Facility: CLINIC | Age: 68
End: 2023-01-17

## 2023-01-17 NOTE — TELEPHONE ENCOUNTER
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  Patient wondering if pink eye is contagious and if child should be sent home to parents.    Informed patient it sounds like pink eye based on symptoms and it is contagious; child should be sent home with parents and evaluated in clinic by their provider.  Informed patient she should also notify other children's parents of possible exposure and have contact precautions until parents  the child (clean off anything contaminated and wash hands).    Patient verbalized understanding and agreement.    Trudy Adamson RN

## 2023-04-10 ENCOUNTER — OFFICE VISIT (OUTPATIENT)
Dept: FAMILY MEDICINE | Facility: CLINIC | Age: 68
End: 2023-04-10
Payer: COMMERCIAL

## 2023-04-10 VITALS
OXYGEN SATURATION: 97 % | TEMPERATURE: 97.5 F | SYSTOLIC BLOOD PRESSURE: 154 MMHG | BODY MASS INDEX: 26.73 KG/M2 | HEART RATE: 97 BPM | HEIGHT: 64 IN | WEIGHT: 156.6 LBS | DIASTOLIC BLOOD PRESSURE: 90 MMHG | RESPIRATION RATE: 16 BRPM

## 2023-04-10 DIAGNOSIS — L84 CORN OR CALLUS: Primary | ICD-10-CM

## 2023-04-10 DIAGNOSIS — R26.9 ALTERED GAIT: ICD-10-CM

## 2023-04-10 DIAGNOSIS — Z86.73 HISTORY OF CEREBROVASCULAR ACCIDENT: ICD-10-CM

## 2023-04-10 DIAGNOSIS — Z12.11 SCREEN FOR COLON CANCER: ICD-10-CM

## 2023-04-10 PROCEDURE — 99213 OFFICE O/P EST LOW 20 MIN: CPT | Performed by: PHYSICIAN ASSISTANT

## 2023-04-10 NOTE — PROGRESS NOTES
"  Assessment & Plan       ICD-10-CM    1. Corn or callus  L84       2. Screen for colon cancer  Z12.11 Colonoscopy Screening  Referral      3. Altered gait  R26.9 Physical Therapy Referral      4. History of cerebrovascular accident  Z86.73 Physical Therapy Referral          1) Corn pared down with a bendable blade. Patient tolerated this well. No pinpoint hemorrhages seen. Skin lines appear to be uninterrupted. I recommended she wear corn pads over the area.     2) Order placed for colonoscopy.    3,4) Referral to physical therapy            BMI:   Estimated body mass index is 27.23 kg/m  as calculated from the following:    Height as of this encounter: 1.615 m (5' 3.58\").    Weight as of this encounter: 71 kg (156 lb 9.6 oz).     Return in about 5 months (around 9/10/2023) for your annual physical, a med check, fasting labs, with Lisbet, in person.     Lisbet Grant PA-C  [facility] JAY Medel is a 67 year old, presenting for the following health issues:  Derm Problem        4/10/2023     6:54 AM   Additional Questions   Roomed by mp   Accompanied by na         4/10/2023     6:54 AM   Patient Reported Additional Medications   Patient reports taking the following new medications none per patient     History of Present Illness       Reason for visit:  Wart removal    She eats 4 or more servings of fruits and vegetables daily.She consumes 0 sweetened beverage(s) daily.She exercises with enough effort to increase her heart rate 30 to 60 minutes per day.  She exercises with enough effort to increase her heart rate 4 days per week.   She is taking medications regularly.       WART(S)    Onset: ongoing    Description (location/number): 1, right foot    Accompanying signs and symptoms: Painful: no    Therapies tried and outcome: wart gun used prior     Hasn't changed since it was frozen last fall      Would like a physical therapy referral to discuss her gait  Her left knee " "doesn't always bend when she's walking  Seems to have started after her CVA  Denies joint pain, knee pain        Review of Systems   Constitutional, MSK, neuro, skin systems are negative, except as otherwise noted.      Objective    BP (!) 154/90   Pulse 97   Temp 97.5  F (36.4  C) (Temporal)   Resp 16   Ht 1.615 m (5' 3.58\")   Wt 71 kg (156 lb 9.6 oz)   LMP  (LMP Unknown)   SpO2 97%   BMI 27.23 kg/m    Body mass index is 27.23 kg/m .  Physical Exam   GENERAL: healthy, alert and no distress  MS: no gross musculoskeletal defects noted, no edema  SKIN: corn over 2nd MTP  NEURO: Normal strength and tone, mentation intact and speech normal  PSYCH: mentation appears normal, affect normal/bright              "

## 2023-05-03 ENCOUNTER — HOSPITAL ENCOUNTER (OUTPATIENT)
Dept: PHYSICAL THERAPY | Facility: CLINIC | Age: 68
Setting detail: THERAPIES SERIES
Discharge: HOME | End: 2023-05-03
Attending: PHYSICIAN ASSISTANT
Payer: COMMERCIAL

## 2023-05-03 DIAGNOSIS — Z86.73 HISTORY OF CEREBROVASCULAR ACCIDENT: ICD-10-CM

## 2023-05-03 DIAGNOSIS — R26.9 ALTERED GAIT: ICD-10-CM

## 2023-05-03 PROCEDURE — 97161 PT EVAL LOW COMPLEX 20 MIN: CPT | Mod: GP

## 2023-05-03 PROCEDURE — 97110 THERAPEUTIC EXERCISES: CPT | Mod: GP

## 2023-05-03 NOTE — PROGRESS NOTES
05/03/23 0700   Quick Adds   Quick Adds Certification   Type of Visit Initial OP PT Evaluation       Present Yes   General Information   Start of Care Date 05/03/23   Referring Physician Lisbet Grant PA-C   Orders Evaluate and Treat as Indicated   Order Date 04/10/23   Medical Diagnosis Altered gait, h/o CVA   Onset of illness/injury or Date of Surgery 01/01/21  (CVA in January 2021)   Surgical/Medical history reviewed Yes   Pertinent history of current problem (include personal factors and/or comorbidities that impact the POC) Lizy is a 68 yo female here for PT evaluation. Per chart review, Lizy requested a PT referral as she feels her L knee doesn't always bend when she is walking, and it seems to have started when she had a CVA in January 2021. Denies knee and joint pain. States she walks a lot and feels like her toe isn't going up. Can't bring her foot to knee on L side when sitting down without helping with hands. Feels her balance is okay. Feels it slows her down. Does kettlebell and walking every day.   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 Has stairs in the house with a railing and those seem to be fine.   Patient/Family Goals Statement Improve gait   Fall Risk Screen   Fall screen completed by PT   Have you fallen 2 or more times in the past year? No   Have you fallen and had an injury in the past year? No   Is patient a fall risk? No   Pain   Patient currently in pain No   Range of Motion (ROM)   ROM Comment RLE ROM WNL. LLE limited in ankle DF, lacks end ROM with knee extension, and tightness noted throughout quads.   Strength   Strength Comments Global weakness on LLE compared to RLE. RLE 5/5 in all motions. LLE: hip flexion 4+/5, knee extension 4/5, knee flexion 4/5, ankle DF 4+/5, hip abduction 4+/5, hip adduction 5/5.   Gait   Gait  Comments Ambulates with LLE maintained in extension and minimal knee bend through swing phase. Slight hip hike on L side, slight circumduction on L side. Decreased ankle DF during swing phase. On stairs: reciprocal pattern although swings LLE out to side a bit to get foot up to next step. Coming down, has decreased quad control with decreased control during descent.   Gait Special Tests   Gait Special Tests 25 FOOT TIMED WALK   Gait Special Tests 25 Foot Timed Walk   Seconds 8.4   Comments 0.9 m/s gait speed   Balance   Balance Comments No large deficits noticed in balance. 5xSTS no UE use: 8.71   Planned Therapy Interventions   Planned Therapy Interventions gait training;neuromuscular re-education;ROM;strengthening   Clinical Impression   Criteria for Skilled Therapeutic Interventions Met yes, treatment indicated   PT Diagnosis Impaired ROM and strength contributing to abnormal gait pattern   Influenced by the following impairments H/o CVA in 2021   Functional limitations due to impairments Decreased efficiency with ambulation leading to decreased efficiency with daily tasks   Clinical Presentation Stable/Uncomplicated   Clinical Presentation Rationale Symptom report, clinical judgment   Clinical Decision Making (Complexity) Low complexity   Therapy Frequency other (see comments)  (Every other week)   Predicted Duration of Therapy Intervention (days/wks) up to 90 days   Risk & Benefits of therapy have been explained Yes   Patient, Family & other staff in agreement with plan of care Yes   Clinical Impression Comments Lizy is a 66 yo female who presents to PT for evaluation. She demonstrates an abnormal gait pattern with LLE maintained in extension and minimal knee bend through swing phase. This has been the case since her CVA in Jan 2021. 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    GOALS   PT Eval Goals 1;2;3   Goal 1   Goal Identifier Gait   Goal Description Lizy will demonstrate a normalized gait pattern with appropriate knee bend through swing phase and free from compensations in order to improve gait efficiency and prevent injury.   Target Date 07/31/23   Goal 2   Goal Identifier Gait speed   Goal Description Lizy will improve gait speed by 0.2 m/s or greater in order to improve efficiency to complete daily tasks.   Target Date 07/31/23   Goal 3   Goal Identifier LLE   Goal Description Lizy will improve LLE strength to symmetrical with RLE strength in order to aid in gait improvements.   Target Date 07/31/23   Total Evaluation Time   PT Eval, Low Complexity Minutes (46220) 30   Therapy Certification   Certification date from 05/03/23   Certification date to 07/31/23   Medical Diagnosis Altered gait, h/o CVA   Certification I certify the need for these services furnished under this plan of treatment and while under my care.  (Physician co-signature of this document indicates review and certification of the therapy plan).       Thank you for referring Lizy to outpatient physical therapy. Please do not hesitate to contact me with any questions via email at [email or web address].     Emilia Hwaley, PT, DPT  [employer]   [email or web address]

## 2023-05-03 NOTE — PROGRESS NOTES
[facility]                                                                                   OUTPATIENT PHYSICAL THERAPY FUNCTIONAL EVALUATION  PLAN OF TREATMENT FOR OUTPATIENT REHABILITATION  (COMPLETE FOR INITIAL CLAIMS ONLY)  Patient's Last Name, First Name, M.I.  YOB: 1955  Lizy Clark     Provider's Name   [facility]   Medical Record No.  [id number]     Start of Care Date:  05/03/23   Onset Date:  01/01/21 (CVA in January 2021)   Type:     _X__PT   ____OT  ____SLP Medical Diagnosis:  Altered gait, h/o CVA     PT Diagnosis:  Impaired ROM and strength contributing to abnormal gait pattern Visits from SOC:  1                              __________________________________________________________________________________  Plan of Treatment/Functional Goals:  gait training, neuromuscular re-education, ROM, strengthening           GOALS  Gait  Lizy will demonstrate a normalized gait pattern with appropriate knee bend through swing phase and free from compensations in order to improve gait efficiency and prevent injury.  07/31/23    Gait speed  Lizy will improve gait speed by 0.2 m/s or greater in order to improve efficiency to complete daily tasks.  07/31/23    LLE  Lizy will improve LLE strength to symmetrical with RLE strength in order to aid in gait improvements.  07/31/23           Therapy Frequency:  other (see comments) (Every other week)   Predicted Duration of Therapy Intervention:  up to 90 days    OSWALDO BRISCOE PT                                    I CERTIFY THE NEED FOR THESE SERVICES FURNISHED UNDER        THIS PLAN OF TREATMENT AND WHILE UNDER MY CARE     (Physician co-signature of this document indicates review and certification of the therapy plan).                Certification Date From:  05/03/23   Certification Date To:  07/31/23    Referring  Provider:  Lisbet Grant PA-C    Initial Assessment  See Epic Evaluation- Start of Care Date: 05/03/23

## 2023-06-02 ENCOUNTER — TELEPHONE (OUTPATIENT)
Dept: GASTROENTEROLOGY | Facility: CLINIC | Age: 68
End: 2023-06-02
Payer: COMMERCIAL

## 2023-06-02 NOTE — TELEPHONE ENCOUNTER
Screening Questions  BLUE  KIND OF PREP RED  LOCATION [review exclusion criteria] GREEN  SEDATION TYPE        Y Are you active on mychart?       Lisbet Grant PA-C   Ordering/Referring Provider?        [facility]/MEDICARE What type of coverage do you have?      N Have you had a positive covid test in the last 14 days?     25 1. BMI  [BMI 40+ - review exclusion criteria& smart-phrase document]    Y  2. Are you able to give consent for your medical care? [IF NO,RN REVIEW]          N  3. Are you taking any prescription pain medications on a routine schedule   (ex narcotics: oxycodone, roxicodone, oxycontin,  and percocet)? [RN Review]          3a. EXTENDED PREP What kind of prescription?     N 4. Do you have any chemical dependencies such as alcohol, street drugs, or methadone?        **If yes 3- 5 , please schedule with MAC sedation.**          IF YES TO ANY 6 - 10 - HOSPITAL SETTING ONLY.     N 6.   Do you need assistance transferring?     N 7.   Have you had a heart or lung transplant?    N 8.   Are you currently on dialysis?   N 9.   Do you use daily home oxygen?   N 10. Do you take nitroglycerin?   10a.  If yes, how often?     N 11. Are you currently pregnant?    11a.  If yes, how many weeks? [ Greater than 12 weeks, OR NEEDED]    N 12. Do you have Pulmonary Hypertension? *NEED PAC APPT AT [facility] w/ MAC*     N 13. [review exclusion criteria]  Do you have any implantable devices in your body (pacemaker, defib, LVAD)?    N 14. In the past 6 months, have you had any heart related issues including cardiomyopathy or heart attack?     14a.  If yes, did it require cardiac stenting if so when?     N 15. Have you had a stroke or Transient ischemic attack (TIA - aka  mini stroke ) within 6 months?      N 16. Do you have mod to severe Obstructive Sleep Apnea?  [Hospital only]    N 17. Do you have SEVERE AND UNCONTROLLED asthma? *NEED PAC APPT AT [facility] w/MAC*     18.Do you take blood thinners?  No    N 19. Do you take  "any of the following medications?    NPhentermine    NOzempic    NWegovy (Semaglutide)      19a. If yes, \"Hold for 7 days before procedure.  Please consult your prescribing provider if you have questions about holding this medication.\"     N  20. Do you have chronic kidney disease?      N  21. Do you have a diagnosis of diabetes?     N  22. On a regular basis do you go 3-5 days between bowel movements?      23. Preferred LOCAL Pharmacy for Pre Prescription        [id number] [place] [address] - [address] [place] [address] [address] AT SEC OF [facility] [address]      - CLOSING REMINDERS -    You will receive a call from a Nurse to review instructions and health history.  This assessment must be completed prior to your procedure.  Failure to complete the Nurse assessment may result in the procedure being cancelled.      On the day of your procedure, please designatean adult(s) who can drive you home stay with you for the next 24 hours. The medicines used in the exam will make you sleepy. You will not be able to drive.      You cannot take public transportation, ride share services, or non-medical taxi service without a responsible caregiver.  Medical transport services are allowed with the requirement that a responsible caregiver will receive you at your destination.  We require that drivers and caregivers are confirmed prior to your procedure.      - SCHEDULING DETAILS -  NO &  Hospital Setting Required & If yes, what is the exclusion?   SUJATA  Surgeon    8/4  Date of Procedure  Lower Endoscopy [Colonoscopy]  [facility]ALAX GATORADE WITHOUT MAGNEISUM CITRATE Which Colonoscopy Prep was Sent?     MODERATE Sedation Type     N PAC / Pre-op Required         Patient states she would like to discuss alternative preps available, informed patient that an RN would reach out 1-2 weeks prior to do pre-assessment and go over prep and would be the best time to " inquire alternatives available, patient expressed understanding.

## 2023-07-18 ENCOUNTER — TELEPHONE (OUTPATIENT)
Dept: GASTROENTEROLOGY | Facility: CLINIC | Age: 68
End: 2023-07-18
Payer: COMMERCIAL

## 2023-07-18 NOTE — TELEPHONE ENCOUNTER
Caller:   Reason for Reschedule/Cancellation (please be detailed, any staff messages or encounters to note?): Provider Out    Additional Notes: Patient request to keep procedure with Dr. Zuñiga    Prior to reschedule please review:    Ordering Provider: Lisbet Grant PA-C    Sedation per order: Moderate    Does patient have any ASC Exclusions, please identify?: N      Notes on Cancelled Procedure:    Procedure: Lower Endoscopy [Colonoscopy]     Date: 08/04    Location: [facility]; [address], [place] [address]    Surgeon: Yogesh  Sedation: Moderate    Rescheduled: Yes    Procedure: Lower Endoscopy [Colonoscopy]     Date: 09/22    Location: [facility]; [address], [place] [address]    Surgeon: Yogesh    Sedation Level Scheduled  Moderate,  Reason for Sedation Level Order    Prep/Instructions updated and sent: MyChart      Prep: Miralax Prep

## 2023-09-07 ENCOUNTER — TELEPHONE (OUTPATIENT)
Dept: GASTROENTEROLOGY | Facility: CLINIC | Age: 68
End: 2023-09-07
Payer: COMMERCIAL

## 2023-09-07 NOTE — TELEPHONE ENCOUNTER
Called patient to go over pre assessment but she needs to reschedule this procedure.  Did have discussion with patient in regards to sedation and the last procedure note stating she may benefit from MAC sedation.  Patient agreed this would be a better idea.     Transferred to endo scheduling and patient will request MAC sedation.     Radha Aiken RN

## 2023-09-07 NOTE — TELEPHONE ENCOUNTER
Caller: Lizy  Reason for Reschedule/Cancellation (please be detailed, any staff messages or encounters to note?): patient needs MAC sedation      Prior to reschedule please review:  Ordering Provider: Juanita  Sedation per order: CS  Does patient have any ASC Exclusions, please identify?: N      Notes on Cancelled Procedure:  Procedure: Lower Endoscopy [Colonoscopy]   Date: 9/22/23  Location: [facility]; [address], [place] [address]  Surgeon: Yogesh      Rescheduled: Yes  Procedure: Lower Endoscopy [Colonoscopy]   Date: 1/10/24  Location: [facility]; [address], [place] [address]  Surgeon: Yogesh  Sedation Level Scheduled  MAC,  Reason for Sedation Level Per RN and last procedure  Prep/Instructions updated and sent: Yes       Send In - basket message to Panc - Raoul Pool if EUS  procedure is canceled or rescheduled: [ N/A, YES or NO] NA

## 2023-10-16 DIAGNOSIS — E78.5 HYPERLIPIDEMIA LDL GOAL <70: ICD-10-CM

## 2023-10-16 RX ORDER — ATORVASTATIN CALCIUM 40 MG/1
40 TABLET, FILM COATED ORAL DAILY
Qty: 90 TABLET | Refills: 0 | Status: SHIPPED | OUTPATIENT
Start: 2023-10-16 | End: 2023-12-13

## 2023-10-16 NOTE — TELEPHONE ENCOUNTER
Attempt #1 to call patient. Mobile phone sent to busy signal. RN called home phone.    RN left voicemail and requested return call to [facility] at [phone]    Roshni Wagner RN, BSN  [facility]: [place]

## 2023-10-16 NOTE — TELEPHONE ENCOUNTER
Patient informed of her Medication Refill and that she needs to schedule her Annual Physical ( also due for  fasting labs/med check).   She verbalized a good understanding. She wanted an appointment prior to the end of the year.    Future Appointments 10/16/2023 - 4/13/2024        Date Visit Type Length Department Provider     12/13/2023  7:00 AM ANNUAL WELLNESS 30 min [facility] [place], GIOVANI CNP    Location Instructions:     [facility] is located at [address] [place] [address] [facility], one block east of [address]  and just north of the [address]. Access the parking lot by turning east from [address] onto [address], then turning north on [address] [facility].                   Bharati Ramesh RN BSN  [facility]

## 2023-10-16 NOTE — TELEPHONE ENCOUNTER
Overdue for follow up:  Return in about 5 months (around 9/10/2023) for your annual physical, a med check, fasting labs, with Lisbet, in person.      Last laureano refill sent, please assist with scheduling

## 2023-11-05 ENCOUNTER — HEALTH MAINTENANCE LETTER (OUTPATIENT)
Age: 68
End: 2023-11-05

## 2023-12-13 ENCOUNTER — TELEPHONE (OUTPATIENT)
Dept: FAMILY MEDICINE | Facility: CLINIC | Age: 68
End: 2023-12-13

## 2023-12-13 ENCOUNTER — OFFICE VISIT (OUTPATIENT)
Dept: FAMILY MEDICINE | Facility: CLINIC | Age: 68
End: 2023-12-13
Payer: COMMERCIAL

## 2023-12-13 VITALS
SYSTOLIC BLOOD PRESSURE: 132 MMHG | RESPIRATION RATE: 15 BRPM | HEART RATE: 86 BPM | DIASTOLIC BLOOD PRESSURE: 84 MMHG | OXYGEN SATURATION: 97 % | BODY MASS INDEX: 27.29 KG/M2 | WEIGHT: 154 LBS | TEMPERATURE: 98.2 F | HEIGHT: 63 IN

## 2023-12-13 DIAGNOSIS — Z86.73 HISTORY OF CEREBROVASCULAR ACCIDENT: ICD-10-CM

## 2023-12-13 DIAGNOSIS — Z78.0 POST-MENOPAUSAL: ICD-10-CM

## 2023-12-13 DIAGNOSIS — R73.09 ABNORMAL GLUCOSE: ICD-10-CM

## 2023-12-13 DIAGNOSIS — R03.0 ELEVATED BLOOD PRESSURE READING WITHOUT DIAGNOSIS OF HYPERTENSION: ICD-10-CM

## 2023-12-13 DIAGNOSIS — Z12.11 SCREEN FOR COLON CANCER: ICD-10-CM

## 2023-12-13 DIAGNOSIS — M85.80 OSTEOPENIA, UNSPECIFIED LOCATION: ICD-10-CM

## 2023-12-13 DIAGNOSIS — Z00.00 ENCOUNTER FOR MEDICARE ANNUAL WELLNESS EXAM: Primary | ICD-10-CM

## 2023-12-13 DIAGNOSIS — E78.5 HYPERLIPIDEMIA LDL GOAL <70: ICD-10-CM

## 2023-12-13 DIAGNOSIS — Z23 NEED FOR SHINGLES VACCINE: ICD-10-CM

## 2023-12-13 DIAGNOSIS — Z29.11 NEED FOR VACCINATION AGAINST RESPIRATORY SYNCYTIAL VIRUS: ICD-10-CM

## 2023-12-13 LAB
ALBUMIN SERPL BCG-MCNC: 4.4 G/DL (ref 3.5–5.2)
ALP SERPL-CCNC: 93 U/L (ref 40–150)
ALT SERPL W P-5'-P-CCNC: 39 U/L (ref 0–50)
ANION GAP SERPL CALCULATED.3IONS-SCNC: 9 MMOL/L (ref 7–15)
AST SERPL W P-5'-P-CCNC: 28 U/L (ref 0–45)
BILIRUB SERPL-MCNC: 0.4 MG/DL
BUN SERPL-MCNC: 16.6 MG/DL (ref 8–23)
CALCIUM SERPL-MCNC: 10.2 MG/DL (ref 8.8–10.2)
CHLORIDE SERPL-SCNC: 104 MMOL/L (ref 98–107)
CHOLEST SERPL-MCNC: 181 MG/DL
CREAT SERPL-MCNC: 0.59 MG/DL (ref 0.51–0.95)
DEPRECATED HCO3 PLAS-SCNC: 27 MMOL/L (ref 22–29)
EGFRCR SERPLBLD CKD-EPI 2021: >90 ML/MIN/1.73M2
FASTING STATUS PATIENT QL REPORTED: YES
GLUCOSE SERPL-MCNC: 100 MG/DL (ref 70–99)
HBA1C MFR BLD: 5.5 % (ref 0–5.6)
HDLC SERPL-MCNC: 63 MG/DL
LDLC SERPL CALC-MCNC: 103 MG/DL
NONHDLC SERPL-MCNC: 118 MG/DL
POTASSIUM SERPL-SCNC: 4.6 MMOL/L (ref 3.4–5.3)
PROT SERPL-MCNC: 7.1 G/DL (ref 6.4–8.3)
SODIUM SERPL-SCNC: 140 MMOL/L (ref 135–145)
TRIGL SERPL-MCNC: 76 MG/DL

## 2023-12-13 PROCEDURE — 36415 COLL VENOUS BLD VENIPUNCTURE: CPT | Performed by: NURSE PRACTITIONER

## 2023-12-13 PROCEDURE — 99214 OFFICE O/P EST MOD 30 MIN: CPT | Mod: 25 | Performed by: NURSE PRACTITIONER

## 2023-12-13 PROCEDURE — 83036 HEMOGLOBIN GLYCOSYLATED A1C: CPT | Performed by: NURSE PRACTITIONER

## 2023-12-13 PROCEDURE — 80053 COMPREHEN METABOLIC PANEL: CPT | Performed by: NURSE PRACTITIONER

## 2023-12-13 PROCEDURE — G0438 PPPS, INITIAL VISIT: HCPCS | Performed by: NURSE PRACTITIONER

## 2023-12-13 PROCEDURE — 80061 LIPID PANEL: CPT | Performed by: NURSE PRACTITIONER

## 2023-12-13 RX ORDER — RESPIRATORY SYNCYTIAL VIRUS VACCINE 120MCG/0.5
0.5 KIT INTRAMUSCULAR ONCE
Qty: 1 EACH | Refills: 0 | Status: CANCELLED | OUTPATIENT
Start: 2023-12-13 | End: 2023-12-13

## 2023-12-13 RX ORDER — ATORVASTATIN CALCIUM 40 MG/1
40 TABLET, FILM COATED ORAL DAILY
Qty: 90 TABLET | Refills: 3 | Status: SHIPPED | OUTPATIENT
Start: 2023-12-13

## 2023-12-13 RX ORDER — ASPIRIN 325 MG
325 TABLET, DELAYED RELEASE (ENTERIC COATED) ORAL DAILY
Qty: 90 TABLET | Refills: 3 | Status: SHIPPED | OUTPATIENT
Start: 2023-12-13 | End: 2024-07-03

## 2023-12-13 ASSESSMENT — ENCOUNTER SYMPTOMS
FEVER: 0
HEADACHES: 0
WEAKNESS: 0
HEARTBURN: 0
SORE THROAT: 0
HEMATURIA: 0
MYALGIAS: 0
NERVOUS/ANXIOUS: 0
PALPITATIONS: 0
EYE PAIN: 0
FREQUENCY: 0
CHILLS: 0
DYSURIA: 0
ARTHRALGIAS: 0
DIARRHEA: 0
JOINT SWELLING: 0
NAUSEA: 0
CONSTIPATION: 0
BREAST MASS: 0
DIZZINESS: 0
PARESTHESIAS: 0
SHORTNESS OF BREATH: 0
HEMATOCHEZIA: 0
ABDOMINAL PAIN: 0
COUGH: 0

## 2023-12-13 ASSESSMENT — ACTIVITIES OF DAILY LIVING (ADL): CURRENT_FUNCTION: NO ASSISTANCE NEEDED

## 2023-12-13 ASSESSMENT — PAIN SCALES - GENERAL: PAINLEVEL: NO PAIN (0)

## 2023-12-13 NOTE — PROGRESS NOTES
"SUBJECTIVE:   Lizy is a 68 year old, presenting for the following:  Wellness Visit        12/13/2023     6:57 AM   Additional Questions   Roomed by Ana   Accompanied by Self         12/13/2023     6:57 AM   Patient Reported Additional Medications   Patient reports taking the following new medications NA       Are you in the first 12 months of your Medicare coverage?  No    Healthy Habits:     In general, how would you rate your overall health?  Excellent    Frequency of exercise:  2-3 days/week    Duration of exercise:  15-30 minutes    Do you usually eat at least 4 servings of fruit and vegetables a day, include whole grains    & fiber and avoid regularly eating high fat or \"junk\" foods?  Yes    Taking medications regularly:  Yes    Medication side effects:  None    Ability to successfully perform activities of daily living:  No assistance needed    Home Safety:  No safety concerns identified    Hearing Impairment:  No hearing concerns    In the past 6 months, have you been bothered by leaking of urine?  No    In general, how would you rate your overall mental or emotional health?  Excellent    Additional concerns today:  No          Have you ever done Advance Care Planning? (For example, a Health Directive, POLST, or a discussion with a medical provider or your loved ones about your wishes): No, advance care planning information given to patient to review.  Patient declined advance care planning discussion at this time.      Fall risk  Fallen 2 or more times in the past year?: No  Any fall with injury in the past year?: No    Cognitive Screening Declined; no concerns per patient    Reviewed and updated as needed this visit by clinical staff    Allergies   Problems             Reviewed and updated as needed this visit by Provider      Problems            Social History     Tobacco Use     Smoking status: Never     Smokeless tobacco: Never   Substance Use Topics     Alcohol use: No     Alcohol/week: 0.0 " standard drinks of alcohol         12/13/2023     6:49 AM   Alcohol Use   Prescreen: >3 drinks/day or >7 drinks/week? No       Do you have a current opioid prescription? No  Do you use any other controlled substances or medications that are not prescribed by a provider? None      Current providers sharing in care for this patient include:   Patient Care Team:  Lisbet Grant PA-C as PCP - General (Physician Assistant)  Lisbet Grant PA-C as Physician Assistant (Physician Assistant)  Lisbet Grant PA-C as Assigned PCP    The following health maintenance items are reviewed in Epic and correct as of today:  Health Maintenance   Topic Date Due     ZOSTER IMMUNIZATION (1 of 2) Never done     RSV VACCINE (Pregnancy & 60+) (1 - 1-dose 60+ series) Never done     Pneumococcal Vaccine: 65+ Years (1 - PCV) Never done     DEXA  12/30/2021     COLORECTAL CANCER SCREENING  11/29/2022     LIPID  08/24/2023     INFLUENZA VACCINE (1) Never done     COVID-19 Vaccine (3 - 2023-24 season) 09/01/2023     ANNUAL REVIEW OF HM ORDERS  04/10/2024     MEDICARE ANNUAL WELLNESS VISIT  12/13/2024     FALL RISK ASSESSMENT  12/13/2024     MAMMO SCREENING  12/19/2024     ADVANCE CARE PLANNING  12/13/2028     DTAP/TDAP/TD IMMUNIZATION (3 - Td or Tdap) 08/24/2032     HEPATITIS C SCREENING  Completed     PHQ-2 (once per calendar year)  Completed     IPV IMMUNIZATION  Aged Out     HPV IMMUNIZATION  Aged Out     MENINGITIS IMMUNIZATION  Aged Out     RSV MONOCLONAL ANTIBODY  Aged Out     PAP  Discontinued       Any new diagnosis of family breast, ovarian, or bowel cancer? No    FHS-7:       8/24/2022     6:54 AM 12/19/2022     8:22 AM   Breast CA Risk Assessment (FHS-7)   Did any of your first-degree relatives have breast or ovarian cancer? Yes Yes   Did any of your relatives have bilateral breast cancer? No No   Did any man in your family have breast cancer?  No   Did any woman in your family have breast and ovarian cancer?  No    Did any woman in your family have breast cancer before age 50 y?  No   Do you have 2 or more relatives with breast and/or ovarian cancer?  No   Do you have 2 or more relatives with breast and/or bowel cancer?  No       Mammogram Screening: Recommended mammography every 1-2 years with patient discussion and risk factor consideration  Pertinent mammograms are reviewed under the imaging tab.      Here today for physical.  Is fasting for labs.  Does have history of CVA.  Is not currently taking aspirin.  Is trying to be active daily and eat a well-balanced diet.  Is checking blood pressure at home. Gets around 138/70.       Last Pap: 2016-normal. Never an abnormal.   Last mammogram: 12/22-normal. Mom with breast cancer  Last BMD: 2016-osteopenia.   Last Colonoscopy: 2012-normal no family history  Last eye exam: some time ago  Last dental exam: every other year  Last tetanus vaccine: 8/22  Last influenza vaccine: Declines, sister with Panda Barré after influenza vaccine.   Last shingles vaccine: Encouraged to go to pharmacy  Last pneumonia vaccine: Encouraged to go to pharmacy  Last RSV vaccine: Encouraged to go to pharmacy  Last COVID vaccine: has had both doses   Last COVID booster: Declines  Hep C screen: 2016  HIV screen: N/A  AAA screen (age 65-78 with smoking hx): N/A  IVD (HTN, Hyperlipid, Smoking): N/A  Lung CA screening (50-77, 20 pk smoking hx) Quit within 15 years: N/A    Review of Systems   Constitutional:  Negative for chills and fever.   HENT:  Negative for congestion, ear pain, hearing loss and sore throat.    Eyes:  Negative for pain and visual disturbance.   Respiratory:  Negative for cough and shortness of breath.    Cardiovascular:  Negative for chest pain, palpitations and peripheral edema.   Gastrointestinal:  Negative for abdominal pain, constipation, diarrhea, heartburn, hematochezia and nausea.   Breasts:  Negative for tenderness, breast mass and discharge.   Genitourinary:  Negative for  "dysuria, frequency, genital sores, hematuria, pelvic pain, urgency, vaginal bleeding and vaginal discharge.   Musculoskeletal:  Negative for arthralgias, joint swelling and myalgias.   Skin:  Negative for rash.   Neurological:  Negative for dizziness, weakness, headaches and paresthesias.   Psychiatric/Behavioral:  Negative for mood changes. The patient is not nervous/anxious.        OBJECTIVE:   /84   Pulse 86   Temp 98.2  F (36.8  C) (Tympanic)   Resp 15   Ht 1.607 m (5' 3.25\")   Wt 69.9 kg (154 lb)   LMP  (LMP Unknown)   SpO2 97%   BMI 27.06 kg/m   Estimated body mass index is 27.06 kg/m  as calculated from the following:    Height as of this encounter: 1.607 m (5' 3.25\").    Weight as of this encounter: 69.9 kg (154 lb).  Physical Exam  Constitutional:       Appearance: Normal appearance. She is well-developed.   HENT:      Head: Normocephalic and atraumatic.      Right Ear: Tympanic membrane and external ear normal. No middle ear effusion.      Left Ear: Tympanic membrane and external ear normal.  No middle ear effusion.      Nose: No mucosal edema.   Neck:      Thyroid: No thyromegaly.      Vascular: No carotid bruit.   Cardiovascular:      Rate and Rhythm: Normal rate and regular rhythm.      Heart sounds: Normal heart sounds.   Pulmonary:      Effort: Pulmonary effort is normal.      Breath sounds: Normal breath sounds.   Abdominal:      General: Bowel sounds are normal.      Palpations: Abdomen is soft.   Skin:     General: Skin is warm and dry.   Neurological:      Mental Status: She is alert.   Psychiatric:         Behavior: Behavior normal.         ASSESSMENT / PLAN:   Need for shingles vaccine  Encouraged to go to pharmacy for vaccine    Need for vaccination against respiratory syncytial virus  Courage to go to pharmacy for vaccine    Encounter for Medicare annual wellness exam  Screening guidelines reviewed.     Screen for colon cancer  - Colonoscopy Screening  Referral; " Future    History of cerebrovascular accident  Previous Hospital notes reviewed.  Is not currently taking any aspirin.  Recommend aspirin, full dose daily.  Educated on use and possible side effects.  Prescription sent.  Follow-up in 1 year  - aspirin (ASA) 325 MG EC tablet; Take 1 tablet (325 mg) by mouth daily  - Adult Eye  Referral; Future  - Lipid panel reflex to direct LDL Fasting; Future  - Lipid panel reflex to direct LDL Fasting    Elevated blood pressure reading without diagnosis of hypertension  Plan to check blood pressure out in the community and notify if is consistently (50% of time) above 130/90.    - aspirin (ASA) 325 MG EC tablet; Take 1 tablet (325 mg) by mouth daily  - Adult Eye  Referral; Future  - Lipid panel reflex to direct LDL Fasting; Future  - Lipid panel reflex to direct LDL Fasting    Post-menopausal  Order placed, plans to call per self and set up  - DEXA HIP/PELVIS/SPINE - Future; Future    Osteopenia, unspecified location  Previous bone density test reviewed-osteopenia.  Continue taking over-the-counter calcium 1200 mg daily.  Follow-up in 1 year.    Hyperlipidemia LDL goal <70  Previous lipids reviewed.  Tolerating atorvastatin well.  Refills given.  Will recheck lipids here today.  Follow-up in 1 year.  - aspirin (ASA) 325 MG EC tablet; Take 1 tablet (325 mg) by mouth daily  - Adult Eye  Referral; Future  - Comprehensive metabolic panel; Future  - Lipid panel reflex to direct LDL Fasting; Future  - Lipid panel reflex to direct LDL Fasting  - Comprehensive metabolic panel  - Adult Eye  Referral; Future  - Comprehensive metabolic panel; Future  - atorvastatin (LIPITOR) 40 MG tablet; Take 1 tablet (40 mg) by mouth daily  - Comprehensive metabolic panel    Abnormal glucose  We will recheck here today.  - Comprehensive metabolic panel; Future  - Hemoglobin A1c; Future  - Hemoglobin A1c  - Comprehensive metabolic panel         COUNSELING:  Reviewed  "preventive health counseling, as reflected in patient instructions      BMI:   Estimated body mass index is 27.06 kg/m  as calculated from the following:    Height as of this encounter: 1.607 m (5' 3.25\").    Weight as of this encounter: 69.9 kg (154 lb).       She reports that she has never smoked. She has never used smokeless tobacco.      Appropriate preventive services were discussed with this patient, including applicable screening as appropriate for fall prevention, nutrition, physical activity, Tobacco-use cessation, weight loss and cognition.  Checklist reviewing preventive services available has been given to the patient.    Reviewed patients plan of care and provided an AVS. The Basic Care Plan (routine screening as documented in Health Maintenance) for Lizy meets the Care Plan requirement. This Care Plan has been established and reviewed with the Patient.    GIOVANI [facility]    Identified Health Risks  "

## 2023-12-13 NOTE — PATIENT INSTRUCTIONS
You can call imaging scheduling to set up appointment date, time, and location that works best for you to have mammogram and bone density [phone].     Please go to the pharmacy for your RSV (respiratory syncytial virus)vaccine.  You will need 1 dose of the RSV vaccine, 1 time. The RSV vaccine can prevent lower respiratory tract disease caused by respiratory syncytial virus (RSV). RSV is a common respiratory virus that usually causes mild, cold-like symptoms. RSV can cause illness in people of all ages but may be especially serious for infants and older adults. Adults at highest risk for severe RSV disease include older adults, adults with chronic medical conditions such as heart or lung disease, weakened immune systems, or certain other underlying medical conditions, or who live in nursing homes or long-term care facilities    Please go to the pharmacy to receive your shingles vaccine, Shingrix.  It is a series of 2.  You should receive the first vaccine and then get the second vaccine in at least 2 months.  If more time lapses that is okay.  Do see a reaction similar to tetanus where your arm gets red, stiff sometimes warm to touch.  After the second dose it is very common to feel tired and rundown for 24 hours or so.    Please go to the pharmacy for your pneumonia vaccine.  There are 1-2 cases of [place] per 100,000 people per year.  The incident of pneumonia and death related to pneumonia is much higher than that.  The benefit of taking the pneumonia vaccine outweighs the risk.  I would recommend that you get it.    You are also due for a colonoscopy.  I have entered that order.  Typically, they contact you within the next 2-3 business days to set up appointment date, time and location.      Patient Education   Personalized Prevention Plan  You are due for the preventive services outlined below.  Your care team is available to assist you in scheduling these services.  If you have already completed  any of these items, please share that information with your care team to update in your medical record.  Health Maintenance Due   Topic Date Due    Zoster (Shingles) Vaccine (1 of 2) Never done    RSV VACCINE (Pregnancy & 60+) (1 - 1-dose 60+ series) Never done    Pneumococcal Vaccine (1 - PCV) Never done    Osteoporosis Screening  12/30/2021    Colorectal Cancer Screening  11/29/2022    Cholesterol Lab  08/24/2023    FALL RISK ASSESSMENT  08/24/2023    Flu Vaccine (1) Never done    COVID-19 Vaccine (3 - 2023-24 season) 09/01/2023     Preventive Health Recommendations    See your health care provider every year to  Review health changes.   Discuss preventive care.    Review your medicines if your doctor has prescribed any.  You no longer need a yearly Pap test unless you've had an abnormal Pap test in the past 10 years. If you have vaginal symptoms, such as bleeding or discharge, be sure to talk with your provider about a Pap test.  Every 1 to 2 years, have a mammogram.  If you are over 69, talk with your health care provider about whether or not you want to continue having screening mammograms.  Every 10 years, have a colonoscopy. Or, have a yearly FIT test (stool test). These exams will check for colon cancer.   Have a cholesterol test every 5 years, or more often if your doctor advises it.   Have a diabetes test (fasting glucose) every three years. If you are at risk for diabetes, you should have this test more often.   At age 65, have a bone density scan (DEXA) to check for osteoporosis (brittle bone disease).    Shots:  Get a flu shot each year.  Get a tetanus shot every 10 years.  Talk to your doctor about your pneumonia vaccines. There are now two you should receive - Pneumovax (PPSV 23) and Prevnar (PCV 13).  Talk to your pharmacist about the shingles vaccine.  Talk to your doctor about the hepatitis B vaccine.    Nutrition:   Eat at least 5 servings of fruits and vegetables each day.  Eat whole-grain bread,  whole-wheat pasta and brown rice instead of white grains and rice.  Get adequate Calcium and Vitamin D.     Lifestyle  Exercise at least 150 minutes a week (30 minutes a day, 5 days a week). This will help you control your weight and prevent disease.  Limit alcohol to one drink per day.  No smoking.   Wear sunscreen to prevent skin cancer.   See your dentist twice a year for an exam and cleaning.  See your eye doctor every 1 to 2 years to screen for conditions such as glaucoma, macular degeneration and cataracts.    Personalized Prevention Plan  You are due for the preventive services outlined below.  Your care team is available to assist you in scheduling these services.  If you have already completed any of these items, please share that information with your care team to update in your medical record.  Health Maintenance   Topic Date Due    ZOSTER IMMUNIZATION (1 of 2) Never done    RSV VACCINE (Pregnancy & 60+) (1 - 1-dose 60+ series) Never done    Pneumococcal Vaccine: 65+ Years (1 - PCV) Never done    DEXA  12/30/2021    COLORECTAL CANCER SCREENING  11/29/2022    LIPID  08/24/2023    FALL RISK ASSESSMENT  08/24/2023    INFLUENZA VACCINE (1) Never done    COVID-19 Vaccine (3 - 2023-24 season) 09/01/2023    ANNUAL REVIEW OF HM ORDERS  04/10/2024    MEDICARE ANNUAL WELLNESS VISIT  12/13/2024    MAMMO SCREENING  12/19/2024    ADVANCE CARE PLANNING  08/24/2027    DTAP/TDAP/TD IMMUNIZATION (3 - Td or Tdap) 08/24/2032    HEPATITIS C SCREENING  Completed    PHQ-2 (once per calendar year)  Completed    IPV IMMUNIZATION  Aged Out    HPV IMMUNIZATION  Aged Out    MENINGITIS IMMUNIZATION  Aged Out    RSV MONOCLONAL ANTIBODY  Aged Out    PAP  Discontinued

## 2023-12-13 NOTE — TELEPHONE ENCOUNTER
Patient calling requesting lab results    Informed we are waiting on two other labs to result; patient verbalized understanding    Lindsay Mccoy RN  [facility]

## 2023-12-15 NOTE — TELEPHONE ENCOUNTER
Pt calling to ask for interpretation of Lipid panel reflex to direct LDL Fasting as she has concerns for LDL. RN also reviewed below labs with pt:    Cholesterol  <200 mg/dL 181       Direct Measure HDL  >=50 mg/dL 63       Non HDL Cholesterol  <130 mg/dL 118     Pt has concerns for:  LDL Cholesterol Calculated  <=100 mg/dL 103 High      Petra Gonsalez RN on 12/15/2023 at 1:58 PM

## 2023-12-22 ENCOUNTER — TELEPHONE (OUTPATIENT)
Dept: GASTROENTEROLOGY | Facility: CLINIC | Age: 68
End: 2023-12-22
Payer: COMMERCIAL

## 2023-12-22 NOTE — TELEPHONE ENCOUNTER
Pre visit planning completed.      Procedure details:    Patient scheduled for Colonoscopy  on 1/10/24.     Arrival time: 1000. Procedure time 1045    Pre op exam needed? N/A    Facility location: [facility]; [address], [place] [address]    Sedation type: Conscious sedation     Indication for procedure: Screen for colon cancer       Chart review:     Electronic implanted devices? No    Recent diagnosis of diverticulitis within the last 6 weeks? No    Diabetic? No    Diabetic medication HOLDING recommendations: (if applicable)  Oral diabetic medications: No  Diabetic injectables: No  Insulin: No      Medication review:    Anticoagulants? No    NSAIDS? No NSAID medications per patient's medication list.  RN will verify with pre-assessment call.    Other medication HOLDING recommendations:  N/A      Prep for procedure:     Bowel prep recommendation: Standard Miralax   Due to:  standard bowel prep.    Prep instructions sent via [employer]     Corinne Kliber, RN  Endoscopy Procedure Pre Assessment RN  [phone] option 4

## 2023-12-27 NOTE — TELEPHONE ENCOUNTER
Attempted to contact patient in order to complete pre assessment questions.     No answer. Left message to return call to [phone] option 4    Missed call communication sent via [employer].    Corinne Kliber, RN  Endoscopy Procedure Pre Assessment RN  [phone] option 4

## 2023-12-27 NOTE — TELEPHONE ENCOUNTER
Updated arrival time of 1115.   Procedure at Noon    Pre assessment completed for upcoming procedure.   (Please see previous telephone encounter notes for complete details)    Patient  returned call.       Procedure details:    Arrival time and facility location reviewed.    Pre op exam needed? N/A    Designated  policy reviewed. Instructed to have someone stay 6 hours post procedure.     COVID policy reviewed.      Medication review:    Medications reviewed. Please see supporting documentation below. Holding recommendations discussed (if applicable).       Prep for procedure:     Procedure prep instructions reviewed.        Additional information needed?  N/A      Patient  verbalized understanding and had no questions or concerns at this time.      Kalyn Higgins RN  Endoscopy Procedure Pre Assessment RN  [phone] option 4

## 2024-01-08 ENCOUNTER — ANESTHESIA EVENT (OUTPATIENT)
Dept: SURGERY | Facility: AMBULATORY SURGERY CENTER | Age: 69
End: 2024-01-08
Payer: COMMERCIAL

## 2024-01-08 NOTE — ANESTHESIA PREPROCEDURE EVALUATION
"Anesthesia Pre-Procedure Evaluation    Patient: Lizy Clark   MRN: [id number] : 1955        Procedure : Procedure(s):  Colonoscopy with CO2 insufflation          No past medical history on file.   Past Surgical History:   Procedure Laterality Date    COLONOSCOPY  2012    Procedure: COLONOSCOPY;  COLONOSCOPY, SCREEN;  Surgeon: Robert Ruggiero MD;  Location: [facility] OR      No Known Allergies   Social History     Tobacco Use    Smoking status: Never    Smokeless tobacco: Never   Substance Use Topics    Alcohol use: No     Alcohol/week: 0.0 standard drinks of alcohol      Wt Readings from Last 1 Encounters:   23 69.9 kg (154 lb)        Anesthesia Evaluation            ROS/MED HX  ENT/Pulmonary:       Neurologic:     (+)          CVA,                      Cardiovascular:     (+) Dyslipidemia hypertension- -   -  - -                                   (-) murmur   METS/Exercise Tolerance: >4 METS    Hematologic:       Musculoskeletal:       GI/Hepatic:     (+)        bowel prep,            Renal/Genitourinary:       Endo:       Psychiatric/Substance Use:       Infectious Disease:       Malignancy:       Other:            Physical Exam    Airway        Mallampati: II   TM distance: > 3 FB   Neck ROM: full   Mouth opening: > 3 cm    Respiratory Devices and Support         Dental     Comment: Teeth examined without any significant abnormality, patient denies loose, missing or chipped teeth or anything removable.         Cardiovascular          Rhythm and rate: regular and normal (-) no murmur    Pulmonary   pulmonary exam normal        breath sounds clear to auscultation           OUTSIDE LABS:  CBC: No results found for: \"WBC\", \"HGB\", \"HCT\", \"PLT\"  BMP:   Lab Results   Component Value Date     2023    POTASSIUM 4.6 2023    CHLORIDE 104 2023    CO2 27 2023    BUN 16.6 2023    CR 0.59 2023     (H) 2023     (H) 2022     COAGS: No " "results found for: \"PTT\", \"INR\", \"FIBR\"  POC: No results found for: \"BGM\", \"HCG\", \"HCGS\"  HEPATIC:   Lab Results   Component Value Date    ALBUMIN 4.4 12/13/2023    PROTTOTAL 7.1 12/13/2023    ALT 39 12/13/2023    AST 28 12/13/2023    ALKPHOS 93 12/13/2023    BILITOTAL 0.4 12/13/2023     OTHER:   Lab Results   Component Value Date    A1C 5.5 12/13/2023    TOMASZ 10.2 12/13/2023       Anesthesia Plan    ASA Status:  2    NPO Status:  NPO Appropriate    Anesthesia Type: MAC.     - Reason for MAC: immobility needed   Induction: Intravenous, Propofol.   Maintenance: TIVA.        Consents    Anesthesia Plan(s) and associated risks, benefits, and realistic alternatives discussed. Questions answered and patient/representative(s) expressed understanding.     - Discussed:     - Discussed with:  Patient      - Extended Intubation/Ventilatory Support Discussed: No.      - Patient is DNR/DNI Status: No     Use of blood products discussed: No .     Postoperative Care    Pain management: IV analgesics.   PONV prophylaxis: Ondansetron (or other 5HT-3), Background Propofol Infusion     Comments:    Other Comments: Discussed plan for IV anesthetic with native airway. Discussed potential need for conversion to general anesthetic with airway management and potential for recall.             Jason Cheng MD    I have reviewed the pertinent notes and labs in the chart from the past 30 days and (re)examined the patient.  Any updates or changes from those notes are reflected in this note.      # Hypercalcemia: Highest Ca = 10.2 mg/dL in last 30 days, will monitor as appropriate         # Overweight: Estimated body mass index is 27.06 kg/m  as calculated from the following:    Height as of 12/13/23: 1.607 m (5' 3.25\").    Weight as of 12/13/23: 69.9 kg (154 lb).      "

## 2024-01-09 RX ORDER — FLUMAZENIL 0.1 MG/ML
0.2 INJECTION, SOLUTION INTRAVENOUS
Status: CANCELLED | OUTPATIENT
Start: 2024-01-09 | End: 2024-01-10

## 2024-01-09 RX ORDER — ONDANSETRON 2 MG/ML
4 INJECTION INTRAMUSCULAR; INTRAVENOUS EVERY 6 HOURS PRN
Status: CANCELLED | OUTPATIENT
Start: 2024-01-09

## 2024-01-09 RX ORDER — NALOXONE HYDROCHLORIDE 0.4 MG/ML
0.2 INJECTION, SOLUTION INTRAMUSCULAR; INTRAVENOUS; SUBCUTANEOUS
Status: CANCELLED | OUTPATIENT
Start: 2024-01-09

## 2024-01-09 RX ORDER — NALOXONE HYDROCHLORIDE 0.4 MG/ML
0.4 INJECTION, SOLUTION INTRAMUSCULAR; INTRAVENOUS; SUBCUTANEOUS
Status: CANCELLED | OUTPATIENT
Start: 2024-01-09

## 2024-01-09 RX ORDER — PROCHLORPERAZINE MALEATE 5 MG
5 TABLET ORAL EVERY 6 HOURS PRN
Status: CANCELLED | OUTPATIENT
Start: 2024-01-09

## 2024-01-09 RX ORDER — ONDANSETRON 4 MG/1
4 TABLET, ORALLY DISINTEGRATING ORAL EVERY 6 HOURS PRN
Status: CANCELLED | OUTPATIENT
Start: 2024-01-09

## 2024-01-09 NOTE — TELEPHONE ENCOUNTER
Patient called in to confirm arrival time of 11:15 AM.  Also wanted to confirm she had the correct amount of Miralax - confirmed patient has 8.3 ounce bottle of Miralax.     Patient verbalized understanding and had no further questions.      Annalisa Chan RN  Endoscopy Procedure Pre-Assessment RN  [phone], option 4

## 2024-01-10 ENCOUNTER — HOSPITAL ENCOUNTER (OUTPATIENT)
Facility: AMBULATORY SURGERY CENTER | Age: 69
Discharge: HOME | End: 2024-01-10
Attending: INTERNAL MEDICINE
Payer: COMMERCIAL

## 2024-01-10 ENCOUNTER — ANESTHESIA (OUTPATIENT)
Dept: SURGERY | Facility: AMBULATORY SURGERY CENTER | Age: 69
End: 2024-01-10
Payer: COMMERCIAL

## 2024-01-10 VITALS
HEART RATE: 82 BPM | DIASTOLIC BLOOD PRESSURE: 78 MMHG | TEMPERATURE: 98.1 F | SYSTOLIC BLOOD PRESSURE: 126 MMHG | OXYGEN SATURATION: 96 % | RESPIRATION RATE: 14 BRPM

## 2024-01-10 VITALS — HEART RATE: 80 BPM

## 2024-01-10 LAB — COLONOSCOPY: NORMAL

## 2024-01-10 PROCEDURE — 88305 TISSUE EXAM BY PATHOLOGIST: CPT | Performed by: PATHOLOGY

## 2024-01-10 PROCEDURE — 45385 COLONOSCOPY W/LESION REMOVAL: CPT | Mod: PT

## 2024-01-10 PROCEDURE — G8907 PT DOC NO EVENTS ON DISCHARG: HCPCS

## 2024-01-10 PROCEDURE — G8918 PT W/O PREOP ORDER IV AB PRO: HCPCS

## 2024-01-10 RX ORDER — LIDOCAINE HYDROCHLORIDE 20 MG/ML
INJECTION, SOLUTION INFILTRATION; PERINEURAL PRN
Status: DISCONTINUED | OUTPATIENT
Start: 2024-01-10 | End: 2024-01-10

## 2024-01-10 RX ORDER — PROPOFOL 10 MG/ML
INJECTION, EMULSION INTRAVENOUS CONTINUOUS PRN
Status: DISCONTINUED | OUTPATIENT
Start: 2024-01-10 | End: 2024-01-10

## 2024-01-10 RX ORDER — PROPOFOL 10 MG/ML
INJECTION, EMULSION INTRAVENOUS PRN
Status: DISCONTINUED | OUTPATIENT
Start: 2024-01-10 | End: 2024-01-10

## 2024-01-10 RX ORDER — ONDANSETRON 2 MG/ML
4 INJECTION INTRAMUSCULAR; INTRAVENOUS
Status: DISCONTINUED | OUTPATIENT
Start: 2024-01-10 | End: 2024-01-11 | Stop reason: HOSPADM

## 2024-01-10 RX ORDER — SODIUM CHLORIDE, SODIUM LACTATE, POTASSIUM CHLORIDE, CALCIUM CHLORIDE 600; 310; 30; 20 MG/100ML; MG/100ML; MG/100ML; MG/100ML
INJECTION, SOLUTION INTRAVENOUS CONTINUOUS
Status: DISCONTINUED | OUTPATIENT
Start: 2024-01-10 | End: 2024-01-11 | Stop reason: HOSPADM

## 2024-01-10 RX ORDER — SODIUM CHLORIDE, SODIUM LACTATE, POTASSIUM CHLORIDE, CALCIUM CHLORIDE 600; 310; 30; 20 MG/100ML; MG/100ML; MG/100ML; MG/100ML
INJECTION, SOLUTION INTRAVENOUS CONTINUOUS PRN
Status: DISCONTINUED | OUTPATIENT
Start: 2024-01-10 | End: 2024-01-10

## 2024-01-10 RX ORDER — LIDOCAINE 40 MG/G
CREAM TOPICAL
Status: DISCONTINUED | OUTPATIENT
Start: 2024-01-10 | End: 2024-01-11 | Stop reason: HOSPADM

## 2024-01-10 RX ADMIN — PROPOFOL 100 MG: 10 INJECTION, EMULSION INTRAVENOUS at 12:14

## 2024-01-10 RX ADMIN — SODIUM CHLORIDE, SODIUM LACTATE, POTASSIUM CHLORIDE, CALCIUM CHLORIDE: 600; 310; 30; 20 INJECTION, SOLUTION INTRAVENOUS at 12:09

## 2024-01-10 RX ADMIN — PROPOFOL 150 MCG/KG/MIN: 10 INJECTION, EMULSION INTRAVENOUS at 12:11

## 2024-01-10 RX ADMIN — LIDOCAINE HYDROCHLORIDE 60 MG: 20 INJECTION, SOLUTION INFILTRATION; PERINEURAL at 12:11

## 2024-01-10 NOTE — ANESTHESIA CARE TRANSFER NOTE
Patient: Lizy Clark    Procedure: Procedure(s):  Colonoscopy with CO2 insufflation  COLONOSCOPY, FLEXIBLE, WITH LESION REMOVAL USING SNARE       Diagnosis: Screen for colon cancer [Z12.11]  Diagnosis Additional Information: No value filed.    Anesthesia Type:   MAC     Note:    Oropharynx: oropharynx clear of all foreign objects  Level of Consciousness: awake  Oxygen Supplementation: room air    Independent Airway: airway patency satisfactory and stable  Dentition: dentition unchanged  Vital Signs Stable: post-procedure vital signs reviewed and stable  Report to RN Given: handoff report given  Patient transferred to: Phase II  Comments: Anesthesia Care Transfer Note    Patient: Lizy Clark    Transferred to: Phase II    Patient vital signs: stable    Airway: none    Monitors on, breathing spontaneously, report to RN    Rosibel Kauffman CRNA  1/10/2024 12:37 PM         Handoff Report: Identifed the Patient, Identified the Reponsible Provider, Reviewed the pertinent medical history, Discussed the surgical course, Reviewed Intra-OP anesthesia mangement and issues during anesthesia, Set expectations for post-procedure period and Allowed opportunity for questions and acknowledgement of understanding      Vitals:  Vitals Value Taken Time   BP     Temp     Pulse     Resp     SpO2         Electronically Signed By: GIOVANI Kiran CRNA  January 10, 2024  12:37 PM

## 2024-01-10 NOTE — H&P
ENDOSCOPY PRE-SEDATION H&P FOR OUTPATIENT PROCEDURES    Lizy Clark  [id number]  1955    Procedure: colonoscopy    Pre-procedure diagnosis: screening    Past medical history: History reviewed. No pertinent past medical history.    Past surgical history:   Past Surgical History:   Procedure Laterality Date    COLONOSCOPY  11/29/2012    Procedure: COLONOSCOPY;  COLONOSCOPY, SCREEN;  Surgeon: Robert Ruggiero MD;  Location: [facility] OR       Current Outpatient Medications   Medication    aspirin (ASA) 325 MG EC tablet    atorvastatin (LIPITOR) 40 MG tablet    BIOTIN PO    CALCIUM PO    cholecalciferol 25 MCG (1000 UT) TABS    COLLAGEN PO    Omega-3 Fatty Acids (FISH OIL MAXIMUM STRENGTH) 1200 MG CPDR     Current Facility-Administered Medications   Medication    lactated ringers infusion    lidocaine (LMX4) kit    lidocaine 1 % 0.1-1 mL    ondansetron (ZOFRAN) injection 4 mg    sodium chloride (PF) 0.9% PF flush 3 mL    sodium chloride (PF) 0.9% PF flush 3 mL     Facility-Administered Medications Ordered in Other Encounters   Medication    lidocaine 2% injection (MDV)    propofol (DIPRIVAN) infusion       No Known Allergies    History of Anesthesia/Sedation Problems: no    PHYSICAL EXAMINATION:  Constitutional: aaox3, cooperative, pleasant  Vitals reviewed: BP (!) 164/92   Temp 98.9  F (37.2  C) (Temporal)   Resp 16   LMP  (LMP Unknown)   SpO2 97%   Wt:   Wt Readings from Last 2 Encounters:   12/13/23 69.9 kg (154 lb)   04/10/23 71 kg (156 lb 9.6 oz)      Eyes: Sclera anicteric/injected  Ears/nose/mouth/throat: Normal oropharynx without ulcers or exudate, mucus membranes moist, hearing intact  Neck: supple, thyroid normal size  CV: No edema  Respiratory: Unlabored breathing  Lymph: No submandibular, supraclavicular or inguinal lymphadenopathy  Abd: Nondistended, no masses, nontender  Skin: warm, perfused, no jaundice  Psych: Normal affect  MSK: normal movement on limited exam.    ASA Score: See Provation  note    Assessment/Plan:     The patient is an appropriate candidate to receive sedation.    Informed consent was discussed with the patient/family, including the risks, benefits, potential complications and any alternative options associated with sedation.    Patient assessment completed just prior to sedation and while under constant observation by the provider. Condition determined to be adequate for proceeding with sedation.    The specific risks for the procedure were discussed with the patient at the time of informed consent and include but are not limited to perforation which could require surgery, missing significant neoplasm or lesion, hemorrhage and adverse sedative complication.      Kishor Zuñiga MD

## 2024-01-10 NOTE — ANESTHESIA POSTPROCEDURE EVALUATION
Patient: Lizy Clark    Procedure: Procedure(s):  Colonoscopy with CO2 insufflation  COLONOSCOPY, FLEXIBLE, WITH LESION REMOVAL USING SNARE       Anesthesia Type:  MAC    Note:  Disposition: Outpatient   Postop Pain Control: Uneventful            Sign Out: Well controlled pain   PONV: No   Neuro/Psych: Uneventful            Sign Out: Acceptable/Baseline neuro status   Airway/Respiratory: Uneventful            Sign Out: Acceptable/Baseline resp. status   CV/Hemodynamics: Uneventful            Sign Out: Acceptable CV status; No obvious hypovolemia; No obvious fluid overload   Other NRE:    DID A NON-ROUTINE EVENT OCCUR? No           Last vitals:  Vitals Value Taken Time   /78 01/10/24 1303   Temp 98.1  F (36.7  C) 01/10/24 1241   Pulse 82 01/10/24 1303   Resp 14 01/10/24 1303   SpO2 96 % 01/10/24 1303       Electronically Signed By: Jason Cheng MD  January 10, 2024  2:01 PM

## 2024-01-12 LAB
PATH REPORT.COMMENTS IMP SPEC: NORMAL
PATH REPORT.COMMENTS IMP SPEC: NORMAL
PATH REPORT.FINAL DX SPEC: NORMAL
PATH REPORT.GROSS SPEC: NORMAL
PATH REPORT.MICROSCOPIC SPEC OTHER STN: NORMAL
PATH REPORT.RELEVANT HX SPEC: NORMAL
PHOTO IMAGE: NORMAL

## 2024-04-02 ENCOUNTER — TELEPHONE (OUTPATIENT)
Dept: FAMILY MEDICINE | Facility: CLINIC | Age: 69
End: 2024-04-02
Payer: COMMERCIAL

## 2024-04-02 NOTE — TELEPHONE ENCOUNTER
Patient has questions in regards to dosing of vitmains    Vit D3 5000 international unit(s) (125 mcg) w/ K2 60 mcg combined, what is recommended vit D3 doses daily she should be on. Patient states she researched and it stated Calcium doesn't absorb as well without K2 .    Patient notes she has the start of osteopenia, RN sees this noted from visit with Roberta Cadena 12/13/23. Patient asked how much calcium she should be taking. RN sees noted from visit on 12/13/24 Roberta Cadena recommended 1200 mg. RN relayed this to patient    Patient taking Zinc 50 mg, patient wants to know how much she should take daily.       patient was also planning to start taking magnesium glycinate 100 mg, patient is wondering what recommended dose is for magnesium per day as well.       RN assisted with scheduling a virtual appointment with PCP on 4/4/24 to discuss to ensure that all patients questions are answered and PCP can go into further detail of vitamins to take/daily dose.       Mary Lou Briseno RN on 4/2/2024 at 8:35 AM

## 2024-04-04 ENCOUNTER — VIRTUAL VISIT (OUTPATIENT)
Dept: FAMILY MEDICINE | Facility: CLINIC | Age: 69
End: 2024-04-04
Payer: COMMERCIAL

## 2024-04-04 DIAGNOSIS — Z78.9 TAKES DIETARY SUPPLEMENTS: Primary | ICD-10-CM

## 2024-04-04 PROCEDURE — 99442 PR PHYSICIAN TELEPHONE EVALUATION 11-20 MIN: CPT | Mod: 93 | Performed by: PHYSICIAN ASSISTANT

## 2024-05-05 ENCOUNTER — TRANSFERRED RECORDS (OUTPATIENT)
Dept: HEALTH INFORMATION MANAGEMENT | Facility: CLINIC | Age: 69
End: 2024-05-05
Payer: COMMERCIAL

## 2024-07-03 ENCOUNTER — OFFICE VISIT (OUTPATIENT)
Dept: OPHTHALMOLOGY | Facility: CLINIC | Age: 69
End: 2024-07-03
Payer: COMMERCIAL

## 2024-07-03 DIAGNOSIS — E78.5 HYPERLIPIDEMIA LDL GOAL <70: ICD-10-CM

## 2024-07-03 DIAGNOSIS — Z86.73 HISTORY OF CEREBROVASCULAR ACCIDENT: ICD-10-CM

## 2024-07-03 DIAGNOSIS — H52.4 PRESBYOPIA: ICD-10-CM

## 2024-07-03 DIAGNOSIS — R03.0 ELEVATED BLOOD PRESSURE READING WITHOUT DIAGNOSIS OF HYPERTENSION: ICD-10-CM

## 2024-07-03 DIAGNOSIS — H43.811 POSTERIOR VITREOUS DETACHMENT, RIGHT EYE: ICD-10-CM

## 2024-07-03 DIAGNOSIS — Z01.01 ENCOUNTER FOR EXAMINATION OF EYES AND VISION WITH ABNORMAL FINDINGS: Primary | ICD-10-CM

## 2024-07-03 PROCEDURE — 92004 COMPRE OPH EXAM NEW PT 1/>: CPT | Performed by: OPHTHALMOLOGY

## 2024-07-03 PROCEDURE — 92015 DETERMINE REFRACTIVE STATE: CPT | Performed by: OPHTHALMOLOGY

## 2024-07-03 ASSESSMENT — CONF VISUAL FIELD
OD_INFERIOR_TEMPORAL_RESTRICTION: 0
OD_SUPERIOR_NASAL_RESTRICTION: 0
OS_NORMAL: 1
METHOD: COUNTING FINGERS
OS_SUPERIOR_NASAL_RESTRICTION: 0
OS_SUPERIOR_TEMPORAL_RESTRICTION: 0
OD_NORMAL: 1
OS_INFERIOR_NASAL_RESTRICTION: 0
OD_INFERIOR_NASAL_RESTRICTION: 0
OD_SUPERIOR_TEMPORAL_RESTRICTION: 0
OS_INFERIOR_TEMPORAL_RESTRICTION: 0

## 2024-07-03 ASSESSMENT — REFRACTION_WEARINGRX
SPECS_TYPE: READERS
OD_CYLINDER: SPHERE
OD_SPHERE: +1.50
OS_CYLINDER: SPHERE
OS_SPHERE: +1.50

## 2024-07-03 ASSESSMENT — REFRACTION_MANIFEST
OS_ADD: +2.50
OD_AXIS: 015
OD_SPHERE: +0.75
OS_SPHERE: +0.75
OS_CYLINDER: +0.75
OS_AXIS: 085
OD_CYLINDER: +0.25
OD_ADD: +2.50

## 2024-07-03 ASSESSMENT — VISUAL ACUITY
OD_SC+: -1
METHOD: SNELLEN - LINEAR
OS_SC+: -1
OD_SC: J2-
OS_SC: 20/40
OS_CC: J1-
OD_CC: J2
OS_PH_SC: 20/25
OS_SC: J2-
OD_SC: 20/40
OD_PH_SC: 20/25

## 2024-07-03 ASSESSMENT — TONOMETRY
OD_IOP_MMHG: 18
IOP_METHOD: APPLANATION
OS_IOP_MMHG: 19

## 2024-07-03 NOTE — LETTER
"7/3/2024      Lizy Clark  [address]  [facility] [address]      Dear Colleague,    Thank you for referring your patient, Lizy Clark, to the [facility]. Please see a copy of my visit note below.     Current Eye Medications:  none     Subjective:  COMPREHENSIVE EYE EXAM - distance uncorrected is stable. Uses reading gls if needed at near, mostly at night to read, but can read during the day/with good lighting without glasses. No pain or discomfort either eye.     Last eye exam -10+ years, [facility] - unaware of any eye issues at that time.     Has noted a floater in the right eye for 2 months.  Je CVA - put on cholesterol lowering meds thereafter.     Objective:  See Ophthalmology Exam.       Assessment:  Baseline eye exam in patient with hx of a CVA; no obvious ocular system sequelae.  Recent posterior vitreous detachment right eye.      ICD-10-CM    1. Encounter for examination of eyes and vision with abnormal findings  Z01.01       2. Presbyopia  H52.4       3. Posterior vitreous detachment, right eye  H43.811       4. History of cerebrovascular accident  Z86.73 Adult Eye  Referral      5. Elevated blood pressure reading without diagnosis of hypertension  R03.0 Adult Eye  Referral      6. Hyperlipidemia LDL goal <70  E78.5 Adult Eye  Referral           Plan:  Glasses prescription given - optional    May use artificial tears up to four times a day (like Refresh Optive, Systane Balance, TheraTears, or generic artificial tears are ok. Avoid \"get the red out\" drops).     Possible posterior vitreous detachment (sudden onset large floater and/or flashing lights) left eye discussed.    DL form completed and faxed.    Call in March 2025 for an appointment in July 2025 for Complete Exam    Dr. Heaton [phone]      DMV form filled out for patient and faxed to DMV. No restrictions      Again, thank you for allowing me to " participate in the care of your patient.        Sincerely,        Kendell Heaton MD

## 2024-07-03 NOTE — PATIENT INSTRUCTIONS
"Glasses prescription given - optional    May use artificial tears up to four times a day (like Refresh Optive, Systane Balance, TheraTears, or generic artificial tears are ok. Avoid \"get the red out\" drops).     DL form completed and faxed.    Call in March 2025 for an appointment in July 2025 for Complete Exam    Dr. Heaton [phone]    "

## 2024-07-03 NOTE — PROGRESS NOTES
" Current Eye Medications:  none     Subjective:  COMPREHENSIVE EYE EXAM - distance uncorrected is stable. Uses reading gls if needed at near, mostly at night to read, but can read during the day/with good lighting without glasses. No pain or discomfort either eye.     Last eye exam -10+ years, [facility] - unaware of any eye issues at that time.     Has noted a floater in the right eye for 2 months.  Je CVA - put on cholesterol lowering meds thereafter.     Objective:  See Ophthalmology Exam.       Assessment:  Baseline eye exam in patient with hx of a CVA; no obvious ocular system sequelae.  Recent posterior vitreous detachment right eye.      ICD-10-CM    1. Encounter for examination of eyes and vision with abnormal findings  Z01.01       2. Presbyopia  H52.4       3. Posterior vitreous detachment, right eye  H43.811       4. History of cerebrovascular accident  Z86.73 Adult Eye  Referral      5. Elevated blood pressure reading without diagnosis of hypertension  R03.0 Adult Eye  Referral      6. Hyperlipidemia LDL goal <70  E78.5 Adult Eye  Referral           Plan:  Glasses prescription given - optional    May use artificial tears up to four times a day (like Refresh Optive, Systane Balance, TheraTears, or generic artificial tears are ok. Avoid \"get the red out\" drops).     Possible posterior vitreous detachment (sudden onset large floater and/or flashing lights) left eye discussed.    DL form completed and faxed.    Call in March 2025 for an appointment in July 2025 for Complete Exam    Dr. Heaton [phone]      DMV form filled out for patient and faxed to DMV. No restrictions  "

## 2024-07-04 ASSESSMENT — EXTERNAL EXAM - RIGHT EYE: OD_EXAM: NORMAL

## 2024-07-04 ASSESSMENT — EXTERNAL EXAM - LEFT EYE: OS_EXAM: NORMAL

## 2024-07-04 ASSESSMENT — SLIT LAMP EXAM - LIDS
COMMENTS: 1+ MEIBOMIAN GLAND DYSFUNCTION
COMMENTS: 1+ MEIBOMIAN GLAND DYSFUNCTION

## 2024-07-04 ASSESSMENT — CUP TO DISC RATIO
OD_RATIO: 0.3
OS_RATIO: 0.3

## 2024-12-23 DIAGNOSIS — E78.5 HYPERLIPIDEMIA LDL GOAL <70: ICD-10-CM

## 2024-12-23 RX ORDER — ATORVASTATIN CALCIUM 40 MG/1
40 TABLET, FILM COATED ORAL DAILY
Qty: 30 TABLET | Refills: 0 | Status: SHIPPED | OUTPATIENT
Start: 2024-12-23

## 2024-12-24 RX ORDER — ATORVASTATIN CALCIUM 40 MG/1
40 TABLET, FILM COATED ORAL DAILY
Qty: 90 TABLET | OUTPATIENT
Start: 2024-12-24

## 2024-12-24 NOTE — TELEPHONE ENCOUNTER
"Patient calling to see why her atorvastatin refill was \"denied\"?   I see a 30 day Rx was sent 2 days ago, due for annual visit.    She will call for the one month if needed, is not out of med yet.    Scheduled AWV with Hillary Thakur on 1/14/25, PCP not available into at least February.    Patient verbalized understanding of and agreement with plan.    Lizy DUGGAN RN  [facility] Triage    "

## 2025-05-27 ENCOUNTER — NURSE TRIAGE (OUTPATIENT)
Dept: FAMILY MEDICINE | Facility: CLINIC | Age: 70
End: 2025-05-27
Payer: COMMERCIAL

## 2025-05-27 NOTE — TELEPHONE ENCOUNTER
"Patient calling with \"a couple questions\".   She ate watermelon a couple days ago and has had upset stomach starting yesterday.  Diarrhea \"all night\" last night.   Liquid last night, less watery and less frequent today.    Denies vomiting or fever, has upper abdominal discomfort, level 8-9 last night, less severe today, level 4-5 today.    See triage below, home care advised.    LEXIE Guevara [facility] Triage      Reason for Disposition   SEVERE diarrhea (e.g., 7 or more times / day more than normal)    Additional Information   Negative: Shock suspected (e.g., cold/pale/clammy skin, too weak to stand, low BP, rapid pulse)   Negative: Difficult to awaken or acting confused (e.g., disoriented, slurred speech)   Negative: Sounds like a life-threatening emergency to the triager   Negative: Vomiting also present and worse than the diarrhea   Negative: Blood in stool and without diarrhea   Negative: Diarrhea begins while taking an antibiotic by mouth (oral antibiotic)   Negative: SEVERE abdominal pain (e.g., excruciating) and present > 1 hour   Negative: SEVERE abdominal pain and age > 60 years   Negative: Bloody, black, or tarry bowel movements  (Exception: Chronic-unchanged black-grey bowel movements and is taking iron pills or Pepto-Bismol.)   Negative: SEVERE diarrhea (e.g., 7 or more times / day more than normal) and age > 60 years   Negative: Constant abdominal pain lasting > 2 hours   Negative: Drinking very little and dehydration suspected (e.g., no urine > 12 hours, very dry mouth, very lightheaded)   Negative: Patient sounds very sick or weak to the triager   Negative: SEVERE diarrhea (e.g., 7 or more times / day more than normal) and present > 24 hours (1 day)   Negative: MODERATE diarrhea (e.g., 4-6 times / day more than normal) and present > 48 hours (2 days)   Negative: MODERATE diarrhea (e.g., 4-6 times / day more than normal) and age > 70 years   Negative: Abdominal pain  (Exceptions: Pain " "clears completely with each passage of diarrhea stool,  or symptoms similar to previously diagnosed irritable bowel syndrome.)   Negative: Fever > 101 F (38.3 C)   Negative: Blood in the stool  (Exception: Only on toilet paper. Reason: Diarrhea can cause rectal irritation with blood on wiping.)   Negative: Mucus or pus in stool has been present > 2 days and diarrhea is more than mild   Negative: Weak immune system (e.g., HIV positive, cancer chemo, splenectomy, organ transplant, chronic steroids)   Negative: Travel to a foreign country in past month   Negative: Recent antibiotic therapy (i.e., within last 2 months) and diarrhea present > 3 days since antibiotic was stopped   Negative: Recent hospitalization and diarrhea present > 3 days   Negative: Tube feedings (e.g., nasogastric, g-tube, j-tube)   Negative: MILD diarrhea (e.g., 1-3 or more stools than normal in past 24 hours) diarrhea and present > 7 days  (Exception: Chronic diarrhea that is not worse.)   Negative: Patient wants to be seen   Negative: Diarrhea is a chronic symptom (recurrent or ongoing AND lasting > 4 weeks)    Answer Assessment - Initial Assessment Questions  1. DIARRHEA SEVERITY: \"How bad is the diarrhea?\" \"How many more stools have you had in the past 24 hours than normal?\"       About 7  2. ONSET: \"When did the diarrhea begin?\"       Last night about 9:30 pm  3. STOOL DESCRIPTION:  \"How loose or watery is the diarrhea?\" \"What is the stool color?\" \"Is there any blood or mucous in the stool?\"      Watery, no blood or mucus noted, brown  4. VOMITING: \"Are you also vomiting?\" If Yes, ask: \"How many times in the past 24 hours?\"       no  5. ABDOMEN PAIN: \"Are you having any abdomen pain?\" If Yes, ask: \"What does it feel like?\" (e.g., crampy, dull, intermittent, constant)       Comes and goes, crampy, MUCH better today compared to last night  6. 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 ORAL INTAKE: If vomiting, \"Have you been able to drink liquids?\" \"How much liquids have you had in the past 24 hours?\"      Yes, urinated 3-4 times today  8. HYDRATION: \"Any signs of dehydration?\" (e.g., dry mouth [not just dry lips], too weak to stand, dizziness, new weight loss) \"When did you last urinate?\"      no  9. EXPOSURE: \"Have you traveled to a foreign country recently?\" \"Have you been exposed to anyone with diarrhea?\" \"Could you have eaten any food that was spoiled?\"      Possibly watermelon she ate on Sunday, did not wash it while cutting  10. ANTIBIOTIC USE: \"Are you taking antibiotics now or have you taken antibiotics in the past 2 months?\"        No, but ate a lot of watermelon on Sunday  11. OTHER SYMPTOMS: \"Do you have any other symptoms?\" (e.g., fever, blood in stool)        no  12. PREGNANCY: \"Is there any chance you are pregnant?\" \"When was your last menstrual period?\"        N/a    Protocols used: Diarrhea-A-[place]    "

## 2025-06-11 ENCOUNTER — PATIENT OUTREACH (OUTPATIENT)
Dept: CARE COORDINATION | Facility: CLINIC | Age: 70
End: 2025-06-11
Payer: COMMERCIAL

## 2025-06-17 ENCOUNTER — PATIENT OUTREACH (OUTPATIENT)
Dept: CARE COORDINATION | Facility: CLINIC | Age: 70
End: 2025-06-17
Payer: COMMERCIAL

## (undated) DEVICE — KIT ENDO FIRST STEP DISINFECTANT 200ML W/POUCH EP-4

## (undated) DEVICE — PAD CHUX UNDERPAD 23X24" 7136